# Patient Record
Sex: MALE | Race: WHITE | NOT HISPANIC OR LATINO | Employment: FULL TIME | URBAN - NONMETROPOLITAN AREA
[De-identification: names, ages, dates, MRNs, and addresses within clinical notes are randomized per-mention and may not be internally consistent; named-entity substitution may affect disease eponyms.]

---

## 2017-07-27 LAB
BASOPHILS ABSOLUTE: 0 K/UL (ref 0–0.2)
BASOPHILS RELATIVE PERCENT: 0.2 % (ref 0–1)
CREAT SERPL-MCNC: 0.7 MG/DL (ref 0.5–1.2)
EOSINOPHILS ABSOLUTE: 0.3 K/UL (ref 0–0.6)
EOSINOPHILS RELATIVE PERCENT: 2.7 % (ref 0–5)
GFR NON-AFRICAN AMERICAN: >60
HCT VFR BLD CALC: 28.4 % (ref 42–52)
HEMOGLOBIN: 9.1 G/DL (ref 14–18)
LYMPHOCYTES ABSOLUTE: 1.6 K/UL (ref 1.1–4.5)
LYMPHOCYTES RELATIVE PERCENT: 16.8 % (ref 20–40)
MCH RBC QN AUTO: 26.1 PG (ref 27–31)
MCHC RBC AUTO-ENTMCNC: 32 G/DL (ref 33–37)
MCV RBC AUTO: 81.6 FL (ref 80–94)
MONOCYTES ABSOLUTE: 0.8 K/UL (ref 0–0.9)
MONOCYTES RELATIVE PERCENT: 8.3 % (ref 0–10)
NEUTROPHILS ABSOLUTE: 6.8 K/UL (ref 1.5–7.5)
NEUTROPHILS RELATIVE PERCENT: 71.2 % (ref 50–65)
PDW BLD-RTO: 19.1 % (ref 11.5–14.5)
PLATELET # BLD: 438 K/UL (ref 130–400)
PMV BLD AUTO: 10.5 FL (ref 9.4–12.4)
RBC # BLD: 3.48 M/UL (ref 4.7–6.1)
WBC # BLD: 9.5 K/UL (ref 4.8–10.8)

## 2017-10-24 ENCOUNTER — OFFICE VISIT (OUTPATIENT)
Dept: GASTROENTEROLOGY | Facility: CLINIC | Age: 47
End: 2017-10-24

## 2017-10-24 VITALS
BODY MASS INDEX: 28.49 KG/M2 | OXYGEN SATURATION: 99 % | SYSTOLIC BLOOD PRESSURE: 124 MMHG | DIASTOLIC BLOOD PRESSURE: 90 MMHG | HEART RATE: 107 BPM | WEIGHT: 199 LBS | TEMPERATURE: 96.1 F | HEIGHT: 70 IN

## 2017-10-24 DIAGNOSIS — R13.14 PHARYNGOESOPHAGEAL DYSPHAGIA: ICD-10-CM

## 2017-10-24 DIAGNOSIS — K22.70 BARRETT'S ESOPHAGUS WITHOUT DYSPLASIA: ICD-10-CM

## 2017-10-24 DIAGNOSIS — Z83.71 FAMILY HX COLONIC POLYPS: ICD-10-CM

## 2017-10-24 DIAGNOSIS — Z80.0 FAMILY HX OF COLON CANCER: ICD-10-CM

## 2017-10-24 DIAGNOSIS — K62.5 BRBPR (BRIGHT RED BLOOD PER RECTUM): Primary | ICD-10-CM

## 2017-10-24 PROBLEM — Z83.719 FAMILY HX COLONIC POLYPS: Status: ACTIVE | Noted: 2017-10-24

## 2017-10-24 PROCEDURE — 99204 OFFICE O/P NEW MOD 45 MIN: CPT | Performed by: NURSE PRACTITIONER

## 2017-10-24 RX ORDER — ACETAMINOPHEN 500 MG
1000 TABLET ORAL EVERY 8 HOURS PRN
COMMUNITY

## 2017-10-24 RX ORDER — DIAZEPAM 10 MG/1
10 TABLET ORAL 2 TIMES DAILY PRN
COMMUNITY

## 2017-10-24 RX ORDER — DEXMETHYLPHENIDATE HYDROCHLORIDE 10 MG/1
10 TABLET ORAL 2 TIMES DAILY
COMMUNITY

## 2017-10-24 RX ORDER — RIFAMPIN 150 MG/1
CAPSULE ORAL 2 TIMES DAILY
COMMUNITY

## 2017-10-24 RX ORDER — OMEPRAZOLE 20 MG/1
20 CAPSULE, DELAYED RELEASE ORAL DAILY
Qty: 30 CAPSULE | Refills: 11 | Status: ON HOLD | OUTPATIENT
Start: 2017-10-24 | End: 2018-02-12 | Stop reason: SDUPTHER

## 2017-10-24 RX ORDER — POLYETHYLENE GLYCOL 3350, SODIUM CHLORIDE, SODIUM BICARBONATE, POTASSIUM CHLORIDE 420; 11.2; 5.72; 1.48 G/4L; G/4L; G/4L; G/4L
4000 POWDER, FOR SOLUTION ORAL SEE ADMIN INSTRUCTIONS
Qty: 4000 ML | Refills: 0 | Status: ON HOLD | OUTPATIENT
Start: 2017-10-24 | End: 2017-11-09

## 2017-10-24 RX ORDER — TIZANIDINE 4 MG/1
TABLET ORAL
Refills: 0 | COMMUNITY
Start: 2017-07-26

## 2017-10-24 RX ORDER — SULFAMETHOXAZOLE AND TRIMETHOPRIM 400; 80 MG/1; MG/1
1 TABLET ORAL 2 TIMES DAILY
COMMUNITY

## 2017-10-24 RX ORDER — OMEPRAZOLE 20 MG/1
20 CAPSULE, DELAYED RELEASE ORAL DAILY
COMMUNITY
End: 2017-10-24 | Stop reason: SDUPTHER

## 2017-10-24 RX ORDER — PREGABALIN 150 MG/1
150 CAPSULE ORAL 2 TIMES DAILY
COMMUNITY

## 2017-10-24 NOTE — PROGRESS NOTES
"[facility] GASTROENTEROLOGY - OFFICE NOTE    10/24/2017    Jae Guevara   1970    Primary Physician: Elmer Montague MD    Chief Complaint   Patient presents with   • GI Problem     barretts   difficulty swallowing.   Brbpr.     HISTORY OF PRESENT ILLNESS    Jae Guevara is a 47 y.o. male presents with singh's. Diagnosed with Singh's esophagus more than 5 years ago.  Takes ppi daily with good control of reflux symptoms.  He denies difficulty swallowing.  Denies hematemesis.  Denies nausea vomiting.  Denies abdominal pain.  Last upper endoscopy was August 2014.      Dysphagia  since 12/2016. Noted with solids and pills. Points to nape neck where feels like food will stick. If \" lean neck to side \" it is easier to swallow. Had egd 8/2014 with esoph dil and helped.  Taking PPI daily.      Intermittent brb pr, x years. This is usually small amount on tissue.  No rectal pain.  Last colonscopy was 11/2012 noting non-engorged hemorrhoidal veins, recommend repeat colonoscopy at age 52.  There is family history of colon cancer involving a paternal grandfather.  He has 2 paternal uncles and 2 paternal aunts who had colon polyps.  Denies constipation or diarrhea.  No abdominal pain.  No unintentional weight loss.    Past Medical History:   Diagnosis Date   • Anxiety    • Singh's esophagus    • Dysthymia    • GERD (gastroesophageal reflux disease)    • Hiatal hernia    • IBS (irritable bowel syndrome)    • Nephrolithiasis    • Schatzki's ring        Past Surgical History:   Procedure Laterality Date   • BACK SURGERY      x 2   • CERVICAL FUSION     • ENDOSCOPY  08/20/2014   • ENDOSCOPY AND COLONOSCOPY  11/05/2012   • OTHER SURGICAL HISTORY      Lithotripsy x 2   • TONSILLECTOMY AND ADENOIDECTOMY         Outpatient Prescriptions Marked as Taking for the 10/24/17 encounter (Office Visit) with LAMINE Aguilar   Medication Sig Dispense Refill   • acetaminophen (TYLENOL) 500 MG tablet Take 1,000 mg by mouth " Every 8 (Eight) Hours As Needed for Mild Pain .     • dexmethylphenidate (FOCALIN) 10 MG tablet Take 10 mg by mouth 2 (Two) Times a Day.     • diazePAM (VALIUM) 10 MG tablet Take 10 mg by mouth 2 (Two) Times a Day As Needed for Anxiety.     • omeprazole (priLOSEC) 20 MG capsule Take 1 capsule by mouth Daily. 30 capsule 11   • pregabalin (LYRICA) 150 MG capsule Take 150 mg by mouth 2 (Two) Times a Day.     • rifAMPin (RIFADIN) 150 MG capsule Take  by mouth 2 (Two) Times a Day.     • sulfamethoxazole-trimethoprim (BACTRIM,SEPTRA) 400-80 MG tablet Take 1 tablet by mouth 2 (Two) Times a Day.     • tiZANidine (ZANAFLEX) 4 MG tablet 1 (ONE) TABLET BY MOUTH EVERY 8 HOURS AS NEEDED FOR MUSCLE SPASMS. HOLD FOR OVERSEDATION/CONFUSION  0   • [DISCONTINUED] omeprazole (priLOSEC) 20 MG capsule Take 20 mg by mouth Daily.         No Known Allergies    Social History     Social History   • Marital status:      Spouse name: N/A   • Number of children: N/A   • Years of education: N/A     Occupational History   • Not on file.     Social History Main Topics   • Smoking status: Former Smoker   • Smokeless tobacco: Never Used   • Alcohol use No   • Drug use: No   • Sexual activity: Not on file     Other Topics Concern   • Not on file     Social History Narrative       Family History   Problem Relation Age of Onset   • Colon polyps Paternal Uncle      in his 50's   • Colon cancer Paternal Grandfather      in his 70's.    • Colon polyps Paternal Aunt      in her 50's    • Colon polyps Paternal Uncle      in his 50's.    • Colon polyps Paternal Aunt      in her 50's        Review of Systems   Constitutional: Negative for appetite change, chills, fatigue, fever and unexpected weight change.   HENT: Positive for trouble swallowing. Negative for sore throat.    Respiratory: Negative for cough, chest tightness, shortness of breath and wheezing.    Cardiovascular: Negative for chest pain and palpitations.   Gastrointestinal: Positive for  "anal bleeding and blood in stool. Negative for abdominal distention, abdominal pain, constipation, diarrhea, nausea, rectal pain and vomiting.        As mentioned in hpi   Genitourinary: Negative for difficulty urinating and hematuria.   Musculoskeletal: Negative for arthralgias and back pain.   Skin: Negative for color change and rash.   Neurological: Negative for dizziness, syncope, light-headedness and headaches.   Psychiatric/Behavioral: Negative for confusion. The patient is not nervous/anxious.        Vitals:    10/24/17 0827   BP: 124/90   BP Location: Left arm   Patient Position: Sitting   Cuff Size: Adult   Pulse: 107   Temp: 96.1 °F (35.6 °C)   SpO2: 99%   Weight: 199 lb (90.3 kg)   Height: 70\" (177.8 cm)      Body mass index is 28.55 kg/(m^2).    Physical Exam   Constitutional: He appears well-developed and well-nourished. No distress.   HENT:   Head: Normocephalic and atraumatic.   Eyes: EOM are normal. No scleral icterus.   Neck: Neck supple. No JVD present.   Cardiovascular: Normal rate, regular rhythm and normal heart sounds.    Pulmonary/Chest: Effort normal and breath sounds normal. No stridor.   Abdominal: Soft. Bowel sounds are normal. He exhibits no distension and no mass. There is no tenderness. There is no rebound and no guarding.   Musculoskeletal: He exhibits no edema.   Neurological: He is alert.   Skin: Skin is warm and dry. No rash noted.   Psychiatric: He has a normal mood and affect. His behavior is normal.   Vitals reviewed.              ASSESSMENT AND PLAN    Jae was seen today for gi problem.    Diagnoses and all orders for this visit:    BRBPR (bright red blood per rectum)  Comments:  Chronic  Orders:  -     Case Request; Standing  -     Case Request    Family hx colonic polyps  -     Case Request; Standing  -     Case Request    Family hx of colon cancer    Pharyngoesophageal dysphagia  -     Case Request; Standing  -     Case Request    Cabrera's esophagus without dysplasia  -     " Case Request; Standing  -     Case Request    Other orders  -     Implement Anesthesia Orders Day of Procedure; Standing  -     Obtain Informed Consent; Standing  -     polyethylene glycol-electrolytes (NULYTELY WITH FLAVOR PACKS) 420 g solution; Take 4,000 mL by mouth See Admin Instructions.  -     omeprazole (priLOSEC) 20 MG capsule; Take 1 capsule by mouth Daily.    In regards to bright red blood per rectum with family history of colon cancer, we will proceed with colonoscopy.  In regards to dysphagia as well as history of Cabrera's we will plan for upper endoscopy as well.  Recommend continue PPI on a daily basis.  ER if worsening symptoms of rectal bleeding.  Office visit 1 year.    ESOPHAGOGASTRODUODENOSCOPY WITH ANESTHESIA (N/A), COLONOSCOPY WITH ANESTHESIA (N/A)   All risks, benefits, alternatives, and indications of colonoscopy procedure have been discussed with the patient. Risks to include perforation of the colon requiring possible surgery or colostomy, risk of bleeding from biopsies or removal of colon tissue, possibility of missing a colon polyp or cancer, or adverse drug reaction.  Benefits to include the diagnosis and management of disease of the colon and rectum. Alternatives to include barium enema, radiographic evaluation, lab testing or no intervention. Pt verbalizes understanding and agrees.     Risk, benefits, and alternatives of endoscopy were explained in full.  They understand that there is a risk of bleeding, perforation, and infection.  The risk of perforation goes up with esophageal dilation.  Other options to evaluate UGI complaints could involve barium swallow or UGI series, but these would be diagnostic tests only.  Patient was given time to ask questions.  I answered them to their satisfaction and they are agreeable to proceeding    Body mass index is 28.55 kg/(m^2).           LAMINE Stewart    EMR Dragon/transcription disclaimer:  Much of this encounter note is electronic  transcription/translation of spoken language to printed text.  The electronic translation of spoken language may be erroneous, or at times, nonsensical words or phrases may be inadvertently transcribed.  Although I have reviewed the note for such errors, some may still exist.

## 2017-11-09 ENCOUNTER — PREP FOR SURGERY (OUTPATIENT)
Dept: OTHER | Facility: HOSPITAL | Age: 47
End: 2017-11-09

## 2017-11-09 ENCOUNTER — TELEPHONE (OUTPATIENT)
Dept: GASTROENTEROLOGY | Facility: CLINIC | Age: 47
End: 2017-11-09

## 2017-11-09 ENCOUNTER — ANESTHESIA EVENT (OUTPATIENT)
Dept: GASTROENTEROLOGY | Facility: HOSPITAL | Age: 47
End: 2017-11-09

## 2017-11-09 ENCOUNTER — ANESTHESIA (OUTPATIENT)
Dept: GASTROENTEROLOGY | Facility: HOSPITAL | Age: 47
End: 2017-11-09

## 2017-11-09 ENCOUNTER — HOSPITAL ENCOUNTER (OUTPATIENT)
Facility: HOSPITAL | Age: 47
Setting detail: HOSPITAL OUTPATIENT SURGERY
Discharge: HOME | End: 2017-11-09
Attending: INTERNAL MEDICINE | Admitting: INTERNAL MEDICINE

## 2017-11-09 VITALS
SYSTOLIC BLOOD PRESSURE: 146 MMHG | HEIGHT: 70 IN | DIASTOLIC BLOOD PRESSURE: 91 MMHG | OXYGEN SATURATION: 99 % | HEART RATE: 48 BPM | RESPIRATION RATE: 19 BRPM | TEMPERATURE: 97.2 F | WEIGHT: 199 LBS | BODY MASS INDEX: 28.49 KG/M2

## 2017-11-09 DIAGNOSIS — K62.5 BRBPR (BRIGHT RED BLOOD PER RECTUM): ICD-10-CM

## 2017-11-09 DIAGNOSIS — K22.70 BARRETT'S ESOPHAGUS WITHOUT DYSPLASIA: ICD-10-CM

## 2017-11-09 DIAGNOSIS — R13.14 PHARYNGOESOPHAGEAL DYSPHAGIA: ICD-10-CM

## 2017-11-09 DIAGNOSIS — Z80.0 FAMILY HX OF COLON CANCER: ICD-10-CM

## 2017-11-09 DIAGNOSIS — Z83.71 FAMILY HX COLONIC POLYPS: ICD-10-CM

## 2017-11-09 PROCEDURE — 43239 EGD BIOPSY SINGLE/MULTIPLE: CPT | Performed by: INTERNAL MEDICINE

## 2017-11-09 PROCEDURE — 88305 TISSUE EXAM BY PATHOLOGIST: CPT | Performed by: INTERNAL MEDICINE

## 2017-11-09 PROCEDURE — 43248 EGD GUIDE WIRE INSERTION: CPT | Performed by: INTERNAL MEDICINE

## 2017-11-09 PROCEDURE — 45378 DIAGNOSTIC COLONOSCOPY: CPT | Performed by: INTERNAL MEDICINE

## 2017-11-09 PROCEDURE — 25010000002 PROPOFOL 10 MG/ML EMULSION: Performed by: NURSE ANESTHETIST, CERTIFIED REGISTERED

## 2017-11-09 RX ORDER — ONDANSETRON 2 MG/ML
4 INJECTION INTRAMUSCULAR; INTRAVENOUS ONCE AS NEEDED
Status: DISCONTINUED | OUTPATIENT
Start: 2017-11-09 | End: 2017-11-09 | Stop reason: HOSPADM

## 2017-11-09 RX ORDER — OMEPRAZOLE 20 MG/1
20 CAPSULE, DELAYED RELEASE ORAL
Qty: 60 CAPSULE | Refills: 11 | Status: SHIPPED | OUTPATIENT
Start: 2017-11-09 | End: 2018-11-16 | Stop reason: SDUPTHER

## 2017-11-09 RX ORDER — SODIUM CHLORIDE 0.9 % (FLUSH) 0.9 %
3 SYRINGE (ML) INJECTION AS NEEDED
Status: DISCONTINUED | OUTPATIENT
Start: 2017-11-09 | End: 2017-11-09 | Stop reason: HOSPADM

## 2017-11-09 RX ORDER — LIDOCAINE HYDROCHLORIDE 20 MG/ML
INJECTION, SOLUTION INFILTRATION; PERINEURAL AS NEEDED
Status: DISCONTINUED | OUTPATIENT
Start: 2017-11-09 | End: 2017-11-09 | Stop reason: SURG

## 2017-11-09 RX ORDER — SODIUM CHLORIDE 9 MG/ML
500 INJECTION, SOLUTION INTRAVENOUS CONTINUOUS PRN
Status: DISCONTINUED | OUTPATIENT
Start: 2017-11-09 | End: 2017-11-09 | Stop reason: HOSPADM

## 2017-11-09 RX ORDER — MELATONIN
1000 DAILY
COMMUNITY

## 2017-11-09 RX ORDER — PROPOFOL 10 MG/ML
VIAL (ML) INTRAVENOUS AS NEEDED
Status: DISCONTINUED | OUTPATIENT
Start: 2017-11-09 | End: 2017-11-09 | Stop reason: SURG

## 2017-11-09 RX ADMIN — PROPOFOL 410 MG: 10 INJECTION, EMULSION INTRAVENOUS at 10:48

## 2017-11-09 RX ADMIN — SODIUM CHLORIDE 500 ML: 0.9 INJECTION, SOLUTION INTRAVENOUS at 10:43

## 2017-11-09 RX ADMIN — LIDOCAINE HYDROCHLORIDE 0.5 ML: 10 INJECTION, SOLUTION EPIDURAL; INFILTRATION; INTRACAUDAL; PERINEURAL at 10:42

## 2017-11-09 RX ADMIN — LIDOCAINE HYDROCHLORIDE 100 MG: 20 INJECTION, SOLUTION INFILTRATION; PERINEURAL at 10:48

## 2017-11-09 NOTE — PLAN OF CARE
Problem: Patient Care Overview (Adult)  Goal: Adult Individualization and Mutuality  Outcome: Ongoing (interventions implemented as appropriate)    11/09/17 1016   Individualization   Patient Specific Preferences none

## 2017-11-09 NOTE — H&P (VIEW-ONLY)
"[facility] GASTROENTEROLOGY - OFFICE NOTE    10/24/2017    Jae Guevara   1970    Primary Physician: Elmer Montague MD    Chief Complaint   Patient presents with   • GI Problem     barretts   difficulty swallowing.   Brbpr.     HISTORY OF PRESENT ILLNESS    Jae Guevara is a 47 y.o. male presents with singh's. Diagnosed with Singh's esophagus more than 5 years ago.  Takes ppi daily with good control of reflux symptoms.  He denies difficulty swallowing.  Denies hematemesis.  Denies nausea vomiting.  Denies abdominal pain.  Last upper endoscopy was August 2014.      Dysphagia  since 12/2016. Noted with solids and pills. Points to nape neck where feels like food will stick. If \" lean neck to side \" it is easier to swallow. Had egd 8/2014 with esoph dil and helped.  Taking PPI daily.      Intermittent brb pr, x years. This is usually small amount on tissue.  No rectal pain.  Last colonscopy was 11/2012 noting non-engorged hemorrhoidal veins, recommend repeat colonoscopy at age 52.  There is family history of colon cancer involving a paternal grandfather.  He has 2 paternal uncles and 2 paternal aunts who had colon polyps.  Denies constipation or diarrhea.  No abdominal pain.  No unintentional weight loss.    Past Medical History:   Diagnosis Date   • Anxiety    • Singh's esophagus    • Dysthymia    • GERD (gastroesophageal reflux disease)    • Hiatal hernia    • IBS (irritable bowel syndrome)    • Nephrolithiasis    • Schatzki's ring        Past Surgical History:   Procedure Laterality Date   • BACK SURGERY      x 2   • CERVICAL FUSION     • ENDOSCOPY  08/20/2014   • ENDOSCOPY AND COLONOSCOPY  11/05/2012   • OTHER SURGICAL HISTORY      Lithotripsy x 2   • TONSILLECTOMY AND ADENOIDECTOMY         Outpatient Prescriptions Marked as Taking for the 10/24/17 encounter (Office Visit) with LAMINE Aguilar   Medication Sig Dispense Refill   • acetaminophen (TYLENOL) 500 MG tablet Take 1,000 mg by mouth " Every 8 (Eight) Hours As Needed for Mild Pain .     • dexmethylphenidate (FOCALIN) 10 MG tablet Take 10 mg by mouth 2 (Two) Times a Day.     • diazePAM (VALIUM) 10 MG tablet Take 10 mg by mouth 2 (Two) Times a Day As Needed for Anxiety.     • omeprazole (priLOSEC) 20 MG capsule Take 1 capsule by mouth Daily. 30 capsule 11   • pregabalin (LYRICA) 150 MG capsule Take 150 mg by mouth 2 (Two) Times a Day.     • rifAMPin (RIFADIN) 150 MG capsule Take  by mouth 2 (Two) Times a Day.     • sulfamethoxazole-trimethoprim (BACTRIM,SEPTRA) 400-80 MG tablet Take 1 tablet by mouth 2 (Two) Times a Day.     • tiZANidine (ZANAFLEX) 4 MG tablet 1 (ONE) TABLET BY MOUTH EVERY 8 HOURS AS NEEDED FOR MUSCLE SPASMS. HOLD FOR OVERSEDATION/CONFUSION  0   • [DISCONTINUED] omeprazole (priLOSEC) 20 MG capsule Take 20 mg by mouth Daily.         No Known Allergies    Social History     Social History   • Marital status:      Spouse name: N/A   • Number of children: N/A   • Years of education: N/A     Occupational History   • Not on file.     Social History Main Topics   • Smoking status: Former Smoker   • Smokeless tobacco: Never Used   • Alcohol use No   • Drug use: No   • Sexual activity: Not on file     Other Topics Concern   • Not on file     Social History Narrative       Family History   Problem Relation Age of Onset   • Colon polyps Paternal Uncle      in his 50's   • Colon cancer Paternal Grandfather      in his 70's.    • Colon polyps Paternal Aunt      in her 50's    • Colon polyps Paternal Uncle      in his 50's.    • Colon polyps Paternal Aunt      in her 50's        Review of Systems   Constitutional: Negative for appetite change, chills, fatigue, fever and unexpected weight change.   HENT: Positive for trouble swallowing. Negative for sore throat.    Respiratory: Negative for cough, chest tightness, shortness of breath and wheezing.    Cardiovascular: Negative for chest pain and palpitations.   Gastrointestinal: Positive for  "anal bleeding and blood in stool. Negative for abdominal distention, abdominal pain, constipation, diarrhea, nausea, rectal pain and vomiting.        As mentioned in hpi   Genitourinary: Negative for difficulty urinating and hematuria.   Musculoskeletal: Negative for arthralgias and back pain.   Skin: Negative for color change and rash.   Neurological: Negative for dizziness, syncope, light-headedness and headaches.   Psychiatric/Behavioral: Negative for confusion. The patient is not nervous/anxious.        Vitals:    10/24/17 0827   BP: 124/90   BP Location: Left arm   Patient Position: Sitting   Cuff Size: Adult   Pulse: 107   Temp: 96.1 °F (35.6 °C)   SpO2: 99%   Weight: 199 lb (90.3 kg)   Height: 70\" (177.8 cm)      Body mass index is 28.55 kg/(m^2).    Physical Exam   Constitutional: He appears well-developed and well-nourished. No distress.   HENT:   Head: Normocephalic and atraumatic.   Eyes: EOM are normal. No scleral icterus.   Neck: Neck supple. No JVD present.   Cardiovascular: Normal rate, regular rhythm and normal heart sounds.    Pulmonary/Chest: Effort normal and breath sounds normal. No stridor.   Abdominal: Soft. Bowel sounds are normal. He exhibits no distension and no mass. There is no tenderness. There is no rebound and no guarding.   Musculoskeletal: He exhibits no edema.   Neurological: He is alert.   Skin: Skin is warm and dry. No rash noted.   Psychiatric: He has a normal mood and affect. His behavior is normal.   Vitals reviewed.              ASSESSMENT AND PLAN    Jae was seen today for gi problem.    Diagnoses and all orders for this visit:    BRBPR (bright red blood per rectum)  Comments:  Chronic  Orders:  -     Case Request; Standing  -     Case Request    Family hx colonic polyps  -     Case Request; Standing  -     Case Request    Family hx of colon cancer    Pharyngoesophageal dysphagia  -     Case Request; Standing  -     Case Request    Cabrera's esophagus without dysplasia  -     " Case Request; Standing  -     Case Request    Other orders  -     Implement Anesthesia Orders Day of Procedure; Standing  -     Obtain Informed Consent; Standing  -     polyethylene glycol-electrolytes (NULYTELY WITH FLAVOR PACKS) 420 g solution; Take 4,000 mL by mouth See Admin Instructions.  -     omeprazole (priLOSEC) 20 MG capsule; Take 1 capsule by mouth Daily.    In regards to bright red blood per rectum with family history of colon cancer, we will proceed with colonoscopy.  In regards to dysphagia as well as history of Cabrera's we will plan for upper endoscopy as well.  Recommend continue PPI on a daily basis.  ER if worsening symptoms of rectal bleeding.  Office visit 1 year.    ESOPHAGOGASTRODUODENOSCOPY WITH ANESTHESIA (N/A), COLONOSCOPY WITH ANESTHESIA (N/A)   All risks, benefits, alternatives, and indications of colonoscopy procedure have been discussed with the patient. Risks to include perforation of the colon requiring possible surgery or colostomy, risk of bleeding from biopsies or removal of colon tissue, possibility of missing a colon polyp or cancer, or adverse drug reaction.  Benefits to include the diagnosis and management of disease of the colon and rectum. Alternatives to include barium enema, radiographic evaluation, lab testing or no intervention. Pt verbalizes understanding and agrees.     Risk, benefits, and alternatives of endoscopy were explained in full.  They understand that there is a risk of bleeding, perforation, and infection.  The risk of perforation goes up with esophageal dilation.  Other options to evaluate UGI complaints could involve barium swallow or UGI series, but these would be diagnostic tests only.  Patient was given time to ask questions.  I answered them to their satisfaction and they are agreeable to proceeding    Body mass index is 28.55 kg/(m^2).           LAMINE Stewart    EMR Dragon/transcription disclaimer:  Much of this encounter note is electronic  transcription/translation of spoken language to printed text.  The electronic translation of spoken language may be erroneous, or at times, nonsensical words or phrases may be inadvertently transcribed.  Although I have reviewed the note for such errors, some may still exist.

## 2017-11-09 NOTE — ANESTHESIA PREPROCEDURE EVALUATION
Anesthesia Evaluation     Patient summary reviewed and Nursing notes reviewed   no history of anesthetic complications:  NPO Solid Status: > 8 hours  NPO Liquid Status: > 8 hours     Airway   Mallampati: I  TM distance: >3 FB  Neck ROM: limited  possible difficult intubation  Dental      Pulmonary     breath sounds clear to auscultation  (-) asthma, sleep apnea, not a smoker  Cardiovascular   Exercise tolerance: good (4-7 METS)    Rhythm: regular  Rate: normal    (-) hypertension, past MI, CAD      Neuro/Psych  (+) psychiatric history Anxiety,    (-) seizures, TIA, CVA  GI/Hepatic/Renal/Endo    (+)  hiatal hernia, GERD, renal disease stones,   (-) liver disease, diabetes    Musculoskeletal         ROS comment:   Back surgery 12/2016, multiple complications with infection, now on rifampin. Now has fusion c7-L1  Abdominal    Substance History      OB/GYN          Other            Phys Exam Other: Very limited neck extension since c7 fusion                                Anesthesia Plan    ASA 2     general     intravenous induction   Anesthetic plan and risks discussed with patient.

## 2017-11-09 NOTE — ANESTHESIA POSTPROCEDURE EVALUATION
"Patient: Jae Guevara    Procedure Summary     Date Anesthesia Start Anesthesia Stop Room / Location    11/09/17 1046 1120  [facility]       Procedure Diagnosis Surgeon Provider    ESOPHAGOGASTRODUODENOSCOPY WITH ANESTHESIA (N/A Esophagus); COLONOSCOPY WITH ANESTHESIA (N/A ) Family hx colonic polyps; BRBPR (bright red blood per rectum); Pharyngoesophageal dysphagia; Family hx of colon cancer; Cabrera's esophagus without dysplasia  (Family hx colonic polyps [Z83.71]; BRBPR (bright red blood per rectum) [K62.5]; Pharyngoesophageal dysphagia [R13.14]; Family hx of colon cancer [Z80.0]; Cabrera's esophagus without dysplasia [K22.70]) MD Rubina [facility] CRNA          Anesthesia Type: general  Last vitals  BP   114/79 (11/09/17 1017)   Temp   97.2 °F (36.2 °C) (11/09/17 1017)   Pulse   59 (11/09/17 1017)   Resp   18 (11/09/17 1017)     SpO2   96 % (11/09/17 1017)     Post Anesthesia Care and Evaluation    Patient location during evaluation: PACU  Patient participation: complete - patient participated  Level of consciousness: awake and alert  Pain score: 0  Pain management: adequate  Airway patency: patent  Anesthetic complications: No anesthetic complications    Cardiovascular status: acceptable  Respiratory status: acceptable  Hydration status: acceptable    Comments: Blood pressure 114/79, pulse 59, temperature 97.2 °F (36.2 °C), temperature source Temporal Artery , resp. rate 18, height 69.5\" (176.5 cm), weight 199 lb (90.3 kg), SpO2 96 %.        "

## 2017-11-09 NOTE — PLAN OF CARE
Problem: Patient Care Overview (Adult)  Goal: Plan of Care Review  Outcome: Outcome(s) achieved Date Met:  11/09/17 11/09/17 1142   Coping/Psychosocial Response Interventions   Plan Of Care Reviewed With patient;spouse   Patient Care Overview   Progress no change   Outcome Evaluation   Outcome Summary/Follow up Plan meets discharge criteria         Problem: GI Endoscopy (Adult)  Goal: Signs and Symptoms of Listed Potential Problems Will be Absent or Manageable (GI Endoscopy)  Outcome: Outcome(s) achieved Date Met:  11/09/17 11/09/17 1142   GI Endoscopy   Problems Assessed (GI Endoscopy) all   Problems Present (GI Endoscopy) none

## 2017-11-09 NOTE — PLAN OF CARE
Problem: Patient Care Overview (Adult)  Goal: Plan of Care Review  Outcome: Ongoing (interventions implemented as appropriate)    11/09/17 1051   Coping/Psychosocial Response Interventions   Plan Of Care Reviewed With patient   Patient Care Overview   Progress no change   Outcome Evaluation   Outcome Summary/Follow up Plan pt tolerated procedure well.

## 2017-11-10 LAB
CYTO UR: NORMAL
LAB AP CASE REPORT: NORMAL
LAB AP CLINICAL INFORMATION: NORMAL
Lab: NORMAL
PATH REPORT.FINAL DX SPEC: NORMAL
PATH REPORT.GROSS SPEC: NORMAL

## 2017-11-15 ENCOUNTER — PREP FOR SURGERY (OUTPATIENT)
Dept: OTHER | Facility: HOSPITAL | Age: 47
End: 2017-11-15

## 2017-11-15 DIAGNOSIS — K22.70 BARRETT'S ESOPHAGUS WITHOUT DYSPLASIA: Primary | ICD-10-CM

## 2018-02-12 ENCOUNTER — ANESTHESIA (OUTPATIENT)
Dept: GASTROENTEROLOGY | Facility: HOSPITAL | Age: 48
End: 2018-02-12

## 2018-02-12 ENCOUNTER — HOSPITAL ENCOUNTER (OUTPATIENT)
Facility: HOSPITAL | Age: 48
Setting detail: HOSPITAL OUTPATIENT SURGERY
Discharge: HOME | End: 2018-02-12
Attending: INTERNAL MEDICINE | Admitting: INTERNAL MEDICINE

## 2018-02-12 ENCOUNTER — ANESTHESIA EVENT (OUTPATIENT)
Dept: GASTROENTEROLOGY | Facility: HOSPITAL | Age: 48
End: 2018-02-12

## 2018-02-12 VITALS
OXYGEN SATURATION: 94 % | SYSTOLIC BLOOD PRESSURE: 105 MMHG | TEMPERATURE: 97.5 F | WEIGHT: 201 LBS | RESPIRATION RATE: 17 BRPM | HEIGHT: 70 IN | BODY MASS INDEX: 28.77 KG/M2 | DIASTOLIC BLOOD PRESSURE: 72 MMHG | HEART RATE: 58 BPM

## 2018-02-12 DIAGNOSIS — K22.70 BARRETT'S ESOPHAGUS WITHOUT DYSPLASIA: ICD-10-CM

## 2018-02-12 PROCEDURE — 43248 EGD GUIDE WIRE INSERTION: CPT | Performed by: INTERNAL MEDICINE

## 2018-02-12 PROCEDURE — 88305 TISSUE EXAM BY PATHOLOGIST: CPT | Performed by: INTERNAL MEDICINE

## 2018-02-12 PROCEDURE — 43239 EGD BIOPSY SINGLE/MULTIPLE: CPT | Performed by: INTERNAL MEDICINE

## 2018-02-12 PROCEDURE — 25010000002 PROPOFOL 10 MG/ML EMULSION: Performed by: NURSE ANESTHETIST, CERTIFIED REGISTERED

## 2018-02-12 RX ORDER — SODIUM CHLORIDE 9 MG/ML
500 INJECTION, SOLUTION INTRAVENOUS CONTINUOUS PRN
Status: DISCONTINUED | OUTPATIENT
Start: 2018-02-12 | End: 2018-02-12 | Stop reason: HOSPADM

## 2018-02-12 RX ORDER — SODIUM CHLORIDE 0.9 % (FLUSH) 0.9 %
3 SYRINGE (ML) INJECTION AS NEEDED
Status: DISCONTINUED | OUTPATIENT
Start: 2018-02-12 | End: 2018-02-12 | Stop reason: HOSPADM

## 2018-02-12 RX ORDER — ONDANSETRON 2 MG/ML
4 INJECTION INTRAMUSCULAR; INTRAVENOUS ONCE AS NEEDED
Status: DISCONTINUED | OUTPATIENT
Start: 2018-02-12 | End: 2018-02-12 | Stop reason: HOSPADM

## 2018-02-12 RX ORDER — PROPOFOL 10 MG/ML
VIAL (ML) INTRAVENOUS AS NEEDED
Status: DISCONTINUED | OUTPATIENT
Start: 2018-02-12 | End: 2018-02-12 | Stop reason: SURG

## 2018-02-12 RX ORDER — LIDOCAINE HYDROCHLORIDE 20 MG/ML
INJECTION, SOLUTION INFILTRATION; PERINEURAL AS NEEDED
Status: DISCONTINUED | OUTPATIENT
Start: 2018-02-12 | End: 2018-02-12 | Stop reason: SURG

## 2018-02-12 RX ADMIN — LIDOCAINE HYDROCHLORIDE 0.5 ML: 10 INJECTION, SOLUTION EPIDURAL; INFILTRATION; INTRACAUDAL; PERINEURAL at 09:36

## 2018-02-12 RX ADMIN — PROPOFOL 230 MG: 10 INJECTION, EMULSION INTRAVENOUS at 11:26

## 2018-02-12 RX ADMIN — LIDOCAINE HYDROCHLORIDE 100 MG: 20 INJECTION, SOLUTION INFILTRATION; PERINEURAL at 11:26

## 2018-02-12 RX ADMIN — SODIUM CHLORIDE 500 ML: 9 INJECTION, SOLUTION INTRAVENOUS at 09:37

## 2018-02-12 NOTE — PLAN OF CARE
Problem: Patient Care Overview (Adult)  Goal: Adult Individualization and Mutuality  Outcome: Ongoing (interventions implemented as appropriate)   02/12/18 0965   Individualization   Patient Specific Preferences none

## 2018-02-12 NOTE — ANESTHESIA POSTPROCEDURE EVALUATION
"Patient: Jae Guevara    Procedure Summary     Date Anesthesia Start Anesthesia Stop Room / Location    02/12/18 1119 1135  [facility]       Procedure Diagnosis Surgeon Provider    ESOPHAGOGASTRODUODENOSCOPY WITH ANESTHESIA (N/A Esophagus) Cabrera's esophagus without dysplasia  (Cabrera's esophagus without dysplasia [K22.70]) MD Kristy Reynolds CRNA          Anesthesia Type: general  Last vitals  BP   106/69 (02/12/18 1150)   Temp   97.5 °F (36.4 °C) (02/12/18 0918)   Pulse   64 (02/12/18 1150)   Resp   16 (02/12/18 1150)     SpO2   94 % (02/12/18 1150)     Post Anesthesia Care and Evaluation    Patient location during evaluation: bedside  Patient participation: complete - patient participated  Level of consciousness: awake and awake and alert  Pain score: 0  Pain management: adequate  Airway patency: patent  Anesthetic complications: No anesthetic complications  PONV Status: none  Cardiovascular status: acceptable  Respiratory status: acceptable  Hydration status: acceptable    Comments: Patient discharged according to acceptable Zander score per RN assessment. See nursing records for further information.     Blood pressure 106/69, pulse 64, temperature 97.5 °F (36.4 °C), temperature source Temporal Artery , resp. rate 16, height 177.8 cm (70\"), weight 91.2 kg (201 lb), SpO2 94 %.        "

## 2018-02-12 NOTE — PLAN OF CARE
Problem: Patient Care Overview (Adult)  Goal: Plan of Care Review  Outcome: Outcome(s) achieved Date Met: 02/12/18 02/12/18 1136   Patient Care Overview   Progress no change   Coping/Psychosocial Response Interventions   Plan Of Care Reviewed With patient;family       Problem: GI Endoscopy (Adult)  Goal: Signs and Symptoms of Listed Potential Problems Will be Absent or Manageable (GI Endoscopy)  Outcome: Outcome(s) achieved Date Met: 02/12/18 02/12/18 1136   GI Endoscopy   Problems Assessed (GI Endoscopy) all   Problems Present (GI Endoscopy) none

## 2018-02-12 NOTE — ANESTHESIA PREPROCEDURE EVALUATION
Anesthesia Evaluation     Patient summary reviewed and Nursing notes reviewed   no history of anesthetic complications:  NPO Solid Status: > 8 hours  NPO Liquid Status: N/A           Airway   Mallampati: I  TM distance: >3 FB  Neck ROM: limited  possible difficult intubation  Dental      Pulmonary     breath sounds clear to auscultation  (+) a smoker Current Smoked day of surgery,   (-) asthma, sleep apnea  Cardiovascular   Exercise tolerance: good (4-7 METS)    Rhythm: regular  Rate: normal    (-) hypertension, past MI, CAD      Neuro/Psych  (+) psychiatric history Anxiety,     (-) seizures, TIA, CVA  GI/Hepatic/Renal/Endo    (+)  hiatal hernia, GERD, renal disease stones,   (-) liver disease, diabetes    Musculoskeletal         ROS comment: Back surgery 12/2016, multiple complications with infection. Remains on bactrim. Now has fusion c7-L1  Abdominal    Substance History      OB/GYN          Other            Phys Exam Other: Very limited neck extension since c7 fusion                Anesthesia Plan    ASA 2     general   total IV anesthesia  intravenous induction   Anesthetic plan and risks discussed with patient.

## 2018-02-12 NOTE — H&P
[facility] Gastroenterology  Pre Procedure History & Physical    Chief Complaint:   Dysphagia    Subjective     HPI:   The patient complained of dysphagia.  He had an endoscopy 3 months ago noting active esophagitis.  He was on omeprazole and there was increased to twice a day.  He underwent passive dilation with a 54 French.  Notes continued trouble with swallowing.  Denies any heartburn.    Past Medical History:   Past Medical History:   Diagnosis Date   • Anxiety    • Cabrera's esophagus    • Dysthymia    • GERD (gastroesophageal reflux disease)    • Hiatal hernia    • IBS (irritable bowel syndrome)    • Nephrolithiasis    • Schatzki's ring        Past Surgical History:  Past Surgical History:   Procedure Laterality Date   • BACK SURGERY      x 2   • CERVICAL FUSION     • COLONOSCOPY N/A 11/9/2017    Procedure: COLONOSCOPY WITH ANESTHESIA;  Surgeon: David Portillo MD;  Location: [facility] ENDOSCOPY;  Service:    • ENDOSCOPY  08/20/2014   • ENDOSCOPY N/A 11/9/2017    Procedure: ESOPHAGOGASTRODUODENOSCOPY WITH ANESTHESIA;  Surgeon: David Portillo MD;  Location: [facility] ENDOSCOPY;  Service:    • ENDOSCOPY AND COLONOSCOPY  11/05/2012   • OTHER SURGICAL HISTORY      Lithotripsy x 2   • TONSILLECTOMY AND ADENOIDECTOMY         Family History:  Family History   Problem Relation Age of Onset   • Colon polyps Paternal Uncle      in his 50's   • Colon cancer Paternal Grandfather      in his 70's.    • Colon polyps Paternal Aunt      in her 50's    • Colon polyps Paternal Uncle      in his 50's.    • Colon polyps Paternal Aunt      in her 50's        Social History:   reports that he has quit smoking. He has never used smokeless tobacco. He reports that he does not drink alcohol or use illicit drugs.    Medications:   Prior to Admission medications    Medication Sig Start Date End Date Taking? Authorizing Provider   acetaminophen (TYLENOL) 500 MG tablet Take 1,000 mg by mouth Every 8 (Eight) Hours As Needed for Mild  "Pain .   Yes Historical Provider, MD   cholecalciferol (VITAMIN D3) 1000 units tablet Take 1,000 Units by mouth Daily.   Yes Historical Provider, MD   dexmethylphenidate (FOCALIN) 10 MG tablet Take 10 mg by mouth 2 (Two) Times a Day.   Yes Historical Provider, MD   diazePAM (VALIUM) 10 MG tablet Take 10 mg by mouth 2 (Two) Times a Day As Needed for Anxiety.   Yes Historical Provider, MD   omeprazole (priLOSEC) 20 MG capsule Take 1 capsule by mouth 2 (Two) Times a Day Before Meals. 11/9/17  Yes David Portillo MD   sulfamethoxazole-trimethoprim (BACTRIM,SEPTRA) 400-80 MG tablet Take 1 tablet by mouth 2 (Two) Times a Day.   Yes Historical Provider, MD   tiZANidine (ZANAFLEX) 4 MG tablet 1 (ONE) TABLET BY MOUTH EVERY 8 HOURS AS NEEDED FOR MUSCLE SPASMS. HOLD FOR OVERSEDATION/CONFUSION 7/26/17  Yes Historical Provider, MD   pregabalin (LYRICA) 150 MG capsule Take 150 mg by mouth 2 (Two) Times a Day.    Historical Provider, MD   rifAMPin (RIFADIN) 150 MG capsule Take  by mouth 2 (Two) Times a Day.    Historical Provider, MD   omeprazole (priLOSEC) 20 MG capsule Take 1 capsule by mouth Daily. 10/24/17 2/12/18  Janette Meek, LAMINE       Allergies:  Review of patient's allergies indicates no known allergies.    ROS:    General: Weight stable  Resp: No SOA  Cardiovascular: No CP    Objective     Blood pressure 125/80, pulse 62, temperature 97.5 °F (36.4 °C), temperature source Temporal Artery , resp. rate 16, height 177.8 cm (70\"), weight 91.2 kg (201 lb), SpO2 96 %.    Physical Exam   Constitutional: Pt is oriented to person, place, and in no distress.   HENT: Mouth/Throat: Oropharynx is clear.   Cardiovascular: Normal rate, regular rhythm.    Pulmonary/Chest: Effort normal. No respiratory distress. No  wheezes.   Abdominal: Soft. Non-distended.  Skin: Skin is warm and dry.   Psychiatric: Mood, memory, affect and judgment appear normal.     Assessment/Plan     Diagnosis:  Dysphagia    Anticipated Surgical " Procedure:  Endoscopy    The risks, benefits, and alternatives of this procedure have been discussed with the patient or the responsible party- the patient understands and agrees to proceed.      EMR Dragon/transcription disclaimer:  Much of this encounter note is electronic transcription/translation of spoken language to printed text.  The electronic translation of spoken language may be erroneous, or at times, nonsensical words or phrases may be inadvertently transcribed.  Although I have reviewed the note for such errors, some may still exist.

## 2018-02-12 NOTE — PLAN OF CARE
Problem: Patient Care Overview (Adult)  Goal: Plan of Care Review  Outcome: Ongoing (interventions implemented as appropriate)   02/12/18 1126   Patient Care Overview   Progress improving   Outcome Evaluation   Outcome Summary/Follow up Plan pt tolerating procedure well        Problem: GI Endoscopy (Adult)  Goal: Signs and Symptoms of Listed Potential Problems Will be Absent or Manageable (GI Endoscopy)  Outcome: Ongoing (interventions implemented as appropriate)   02/12/18 1126   GI Endoscopy   Problems Assessed (GI Endoscopy) all   Problems Present (GI Endoscopy) none

## 2018-02-13 LAB
CYTO UR: NORMAL
LAB AP CASE REPORT: NORMAL
Lab: NORMAL
PATH REPORT.FINAL DX SPEC: NORMAL
PATH REPORT.GROSS SPEC: NORMAL

## 2018-11-19 RX ORDER — OMEPRAZOLE 20 MG/1
20 CAPSULE, DELAYED RELEASE ORAL
Qty: 60 CAPSULE | Refills: 11 | Status: SHIPPED | OUTPATIENT
Start: 2018-11-19 | End: 2019-12-30

## 2019-12-30 RX ORDER — OMEPRAZOLE 20 MG/1
20 CAPSULE, DELAYED RELEASE ORAL
Qty: 60 CAPSULE | Refills: 2 | Status: SHIPPED | OUTPATIENT
Start: 2019-12-30

## 2021-03-16 ENCOUNTER — OFFICE VISIT (OUTPATIENT)
Dept: GASTROENTEROLOGY | Facility: CLINIC | Age: 51
End: 2021-03-16

## 2021-03-16 VITALS
OXYGEN SATURATION: 99 % | SYSTOLIC BLOOD PRESSURE: 126 MMHG | BODY MASS INDEX: 28.63 KG/M2 | TEMPERATURE: 97.3 F | DIASTOLIC BLOOD PRESSURE: 86 MMHG | WEIGHT: 200 LBS | HEIGHT: 70 IN | HEART RATE: 90 BPM

## 2021-03-16 DIAGNOSIS — R13.14 PHARYNGOESOPHAGEAL DYSPHAGIA: ICD-10-CM

## 2021-03-16 DIAGNOSIS — R10.10 PAIN OF UPPER ABDOMEN: ICD-10-CM

## 2021-03-16 DIAGNOSIS — K22.70 BARRETT'S ESOPHAGUS WITHOUT DYSPLASIA: Primary | ICD-10-CM

## 2021-03-16 PROCEDURE — 99204 OFFICE O/P NEW MOD 45 MIN: CPT | Performed by: NURSE PRACTITIONER

## 2021-03-16 RX ORDER — IBUPROFEN 800 MG/1
800 TABLET ORAL 2 TIMES DAILY
COMMUNITY

## 2021-03-16 RX ORDER — DICYCLOMINE HYDROCHLORIDE 10 MG/1
10 CAPSULE ORAL EVERY 6 HOURS PRN
Qty: 30 CAPSULE | Refills: 1 | Status: SHIPPED | OUTPATIENT
Start: 2021-03-16 | End: 2021-06-01

## 2021-03-16 NOTE — PROGRESS NOTES
"[facility] GASTROENTEROLOGY - OFFICE NOTE    3/16/2021    Jae Guevara   1970    Primary Physician: Elmer Montague MD    Chief Complaint   Patient presents with   • Endoscopy   singh's esophagus   Dysphagia  Upper abdominal pain.    HISTORY OF PRESENT ILLNESS:     Jae Guevara is a 50 y.o. male presents with Singh's esophagus diagnosed more than 5 years ago. He takes omeprazole twice daily with good control of reflux symptoms.  He gets refills of omeprazole from his PCP now.  No weight loss or abdominal pain.      Dysphagia  This is with solid foods and pills. He has had esophageal dilation that has helped.  He points to the neck/upper chest area where this occurs.  He has had to regurgitate to get relief at times.         Upper abdominal pain  Intermittent since 12/2016. The pain is upper abdomen and described as a \"jarrett horse\". This is a sharp pain that can occur anytime or after eating. It can last few seconds to 10 minutes.  Messaging or applying pressure can help. No n/v. No fever. No weight loss. No flatus or belching.       EGD 2/2018   - Esophageal mucosal changes secondary to established short-segment Singh's disease. Biopsied.  - Normal stomach.  - Normal examined duodenum.  - Dilation performed in the entire esophagus.  - resolved esophagitis  Esophageal biopsy path negative for dysplasia.     =============================================================  Ov  10-24-17 HPI Jae Guevara is a 47 y.o. male presents with singh's. Diagnosed with Singh's esophagus more than 5 years ago.  Takes ppi daily with good control of reflux symptoms.  He denies difficulty swallowing.  Denies hematemesis.  Denies nausea vomiting.  Denies abdominal pain.  Last upper endoscopy was August 2014.        Dysphagia  since 12/2016. Noted with solids and pills. Points to nape neck where feels like food will stick. If \" lean neck to side \" it is easier to swallow. Had egd 8/2014 with tanvioph dil and " helped.  Taking PPI daily.        Intermittent brb pr, x years. This is usually small amount on tissue.  No rectal pain.  Last colonscopy was 11/2012 noting non-engorged hemorrhoidal veins, recommend repeat colonoscopy at age 52.  There is family history of colon cancer involving a paternal grandfather.  He has 2 paternal uncles and 2 paternal aunts who had colon polyps.  Denies constipation or diarrhea.  No abdominal pain.  No unintentional weight loss.    Past Medical History:   Diagnosis Date   • Anxiety    • Cabrera's esophagus    • Dysthymia    • GERD (gastroesophageal reflux disease)    • Hiatal hernia    • IBS (irritable bowel syndrome)    • Nephrolithiasis    • Schatzki's ring        Past Surgical History:   Procedure Laterality Date   • BACK SURGERY      x 2   • CERVICAL FUSION     • COLONOSCOPY N/A 11/9/2017    Procedure: COLONOSCOPY WITH ANESTHESIA;  Surgeon: David Portillo MD;  Location: [facility] ENDOSCOPY;  Service:    • ENDOSCOPY  08/20/2014   • ENDOSCOPY N/A 11/9/2017    Procedure: ESOPHAGOGASTRODUODENOSCOPY WITH ANESTHESIA;  Surgeon: David Portillo MD;  Location: [facility] ENDOSCOPY;  Service:    • ENDOSCOPY N/A 2/12/2018    Procedure: ESOPHAGOGASTRODUODENOSCOPY WITH ANESTHESIA;  Surgeon: David Portillo MD;  Location: [facility] ENDOSCOPY;  Service:    • ENDOSCOPY AND COLONOSCOPY  11/05/2012   • OTHER SURGICAL HISTORY      Lithotripsy x 2   • TONSILLECTOMY AND ADENOIDECTOMY         Outpatient Medications Marked as Taking for the 3/16/21 encounter (Office Visit) with Janette Meek APRN   Medication Sig Dispense Refill   • dexmethylphenidate (FOCALIN) 10 MG tablet Take 10 mg by mouth 2 (Two) Times a Day.     • diazePAM (VALIUM) 10 MG tablet Take 10 mg by mouth 2 (Two) Times a Day As Needed for Anxiety.     • ibuprofen (ADVIL,MOTRIN) 800 MG tablet Take 800 mg by mouth 2 (two) times a day.     • omeprazole (priLOSEC) 20 MG capsule Take 1 capsule by mouth 2 (Two) Times a Day Before Meals. 60 capsule 2  "  • pregabalin (LYRICA) 150 MG capsule Take 150 mg by mouth 2 (Two) Times a Day.     • sulfamethoxazole-trimethoprim (BACTRIM,SEPTRA) 400-80 MG tablet Take 1 tablet by mouth 2 (Two) Times a Day.     • tiZANidine (ZANAFLEX) 4 MG tablet 1 (ONE) TABLET BY MOUTH EVERY 8 HOURS AS NEEDED FOR MUSCLE SPASMS. HOLD FOR OVERSEDATION/CONFUSION  0       No Known Allergies    Social History     Socioeconomic History   • Marital status:      Spouse name: Not on file   • Number of children: Not on file   • Years of education: Not on file   • Highest education level: Not on file   Tobacco Use   • Smoking status: Current Every Day Smoker   • Smokeless tobacco: Never Used   Substance and Sexual Activity   • Alcohol use: No   • Drug use: No   • Sexual activity: Defer       Family History   Problem Relation Age of Onset   • Colon polyps Paternal Uncle         in his 50's   • Colon cancer Paternal Grandfather         in his 70's.    • Colon polyps Paternal Aunt         in her 50's    • Colon polyps Paternal Uncle         in his 50's.    • Colon polyps Paternal Aunt         in her 50's        Review of Systems   Constitutional: Negative for chills, fever and unexpected weight change.   HENT: Positive for trouble swallowing.    Respiratory: Negative for cough, shortness of breath and wheezing.    Cardiovascular: Negative for chest pain and palpitations.   Gastrointestinal: Positive for abdominal pain. Negative for abdominal distention, anal bleeding, blood in stool, constipation, diarrhea, nausea and vomiting.        Vitals:    03/16/21 1405   BP: 126/86   Pulse: 90   Temp: 97.3 °F (36.3 °C)   SpO2: 99%   Weight: 90.7 kg (200 lb)   Height: 177.8 cm (70\")      Body mass index is 28.7 kg/m².    Physical Exam  Vitals reviewed.   Constitutional:       General: He is not in acute distress.  Cardiovascular:      Rate and Rhythm: Normal rate and regular rhythm.      Heart sounds: Normal heart sounds.   Pulmonary:      Effort: Pulmonary " "effort is normal.      Breath sounds: Normal breath sounds.   Abdominal:      General: Bowel sounds are normal. There is no distension.      Palpations: Abdomen is soft.      Tenderness: There is no abdominal tenderness.   Skin:     General: Skin is warm and dry.   Neurological:      Mental Status: He is alert.         Results for orders placed or performed during the hospital encounter of 02/12/18   Tissue Pathology Exam - Tissue, Esophagus, Distal    Specimen: Esophagus, Distal; Tissue   Result Value Ref Range    Case Report       Surgical Pathology Report                         Case: [id number]                                  Authorizing Provider:  David Portillo MD       Collected:           02/12/2018 11:29 AM          Ordering Location:     [facility]     Received:            02/12/2018 02:18 PM                                 ENDOSCOPY                                                                    Pathologist:           Nerissa Agosto MD                                                          Specimen:    Esophagus, Distal, bx at distal esophagus                                                  Final Diagnosis       Esophagus, distal, biopsy:  Benign gastroesophageal junctional-type mucosa with a mild to moderate chronic esophagogastritis.  Negative for intestinal metaplasia.  Negative for dysplasia      Gross Description       Specimen #1 is received in a formalin filled container, labeled with the patient's name, date of birth, and \"biopsy at distal esophagus\".  The specimen consists of 4 tan-pink soft tissue fragments aggregating to 0.5 x 0.3 x 0.1 cm, totally submitted in block 1A.      Microscopic Description       Histologic sections demonstrate benign squamocolumnar junctional type mucosa.  A moderate mixed acute and chronic inflammatory infiltrate is present.  There is no evidence of intestinal metaplasia or dysplasia.      Embedded Images             ASSESSMENT AND " PLAN    Assessment/Plan     Diagnoses and all orders for this visit:    1. Cabrera's esophagus without dysplasia (Primary)  -     Case Request; Standing  -     Case Request    2. Pharyngoesophageal dysphagia  -     Case Request; Standing  -     Case Request    3. Pain of upper abdomen  -     Case Request; Standing  -     Case Request    Other orders  -     dicyclomine (Bentyl) 10 MG capsule; Take 1 capsule by mouth Every 6 (Six) Hours As Needed (abdominal pain).  Dispense: 30 capsule; Refill: 1  -     Follow Anesthesia Guidelines / Protocol; Future  -     Implement Anesthesia Orders Day of Procedure; Standing  -     Obtain Informed Consent; Standing  -     Obtain Informed Consent; Future      In regards to Cabrera's esophagus, he is due for upper endoscopy and is agreeable to scheduling.  We discussed Cabrera's esophagus and is can to develop cancer down the road and he verbalized understanding.  I would recommend that he continue taking a PPI on a daily basis.    In regards to dysphagia, differential diagnosis discussed.  Will await EGD results.  Until then recommend cut food in small pieces and chew well.    In regards to upper abdominal pain, unclear etiology.  It is a possibility that it could be intestinal spasms.  Await upper endoscopy results.  We did discuss trial of Bentyl and he is agreeable.  I discussed adverse effects of Bentyl.     ESOPHAGOGASTRODUODENOSCOPY WITH ANESTHESIA (N/A)   Risk, benefits, and alternatives of endoscopy were explained in full.  They understand that there is a risk of bleeding, perforation, and infection.  The risk of perforation goes up with esophageal dilation.  Other options to evaluate UGI complaints could involve barium swallow or UGI series, but these would be diagnostic tests only.  Patient was given time to ask questions.  I answered them to their satisfaction and they are agreeable to proceeding         Body mass index is 28.7 kg/m².    Patient's Body mass index is 28.7  kg/m². BMI is above normal parameters. Recommendations include: Recommend weight loss, no follow-up..             LAMINE Stewart    EMR Dragon/transcription disclaimer:  Much of this encounter note is electronic transcription/translation of spoken language to printed text.  The electronic translation of spoken language may be erroneous, or at times, nonsensical words or phrases may be inadvertently transcribed.  Although I have reviewed the note for such errors, some may still exist.

## 2021-03-22 ENCOUNTER — TRANSCRIBE ORDERS (OUTPATIENT)
Dept: LAB | Facility: HOSPITAL | Age: 51
End: 2021-03-22

## 2021-03-22 ENCOUNTER — LAB (OUTPATIENT)
Dept: LAB | Facility: HOSPITAL | Age: 51
End: 2021-03-22

## 2021-03-22 DIAGNOSIS — Z01.818 PREOPERATIVE TESTING: Primary | ICD-10-CM

## 2021-03-22 LAB — SARS-COV-2 ORF1AB RESP QL NAA+PROBE: NOT DETECTED

## 2021-03-22 PROCEDURE — U0004 COV-19 TEST NON-CDC HGH THRU: HCPCS | Performed by: INTERNAL MEDICINE

## 2021-03-22 PROCEDURE — C9803 HOPD COVID-19 SPEC COLLECT: HCPCS | Performed by: INTERNAL MEDICINE

## 2021-03-25 ENCOUNTER — ANESTHESIA (OUTPATIENT)
Dept: GASTROENTEROLOGY | Facility: HOSPITAL | Age: 51
End: 2021-03-25

## 2021-03-25 ENCOUNTER — ANESTHESIA EVENT (OUTPATIENT)
Dept: GASTROENTEROLOGY | Facility: HOSPITAL | Age: 51
End: 2021-03-25

## 2021-03-25 ENCOUNTER — HOSPITAL ENCOUNTER (OUTPATIENT)
Facility: HOSPITAL | Age: 51
Setting detail: HOSPITAL OUTPATIENT SURGERY
Discharge: HOME | End: 2021-03-25
Attending: INTERNAL MEDICINE | Admitting: INTERNAL MEDICINE

## 2021-03-25 VITALS
WEIGHT: 196 LBS | BODY MASS INDEX: 28.06 KG/M2 | HEART RATE: 64 BPM | OXYGEN SATURATION: 99 % | TEMPERATURE: 97.2 F | RESPIRATION RATE: 18 BRPM | SYSTOLIC BLOOD PRESSURE: 122 MMHG | DIASTOLIC BLOOD PRESSURE: 89 MMHG | HEIGHT: 70 IN

## 2021-03-25 DIAGNOSIS — K22.70 BARRETT'S ESOPHAGUS WITHOUT DYSPLASIA: ICD-10-CM

## 2021-03-25 DIAGNOSIS — R10.10 PAIN OF UPPER ABDOMEN: ICD-10-CM

## 2021-03-25 DIAGNOSIS — R13.14 PHARYNGOESOPHAGEAL DYSPHAGIA: ICD-10-CM

## 2021-03-25 PROCEDURE — 88305 TISSUE EXAM BY PATHOLOGIST: CPT | Performed by: INTERNAL MEDICINE

## 2021-03-25 PROCEDURE — 25010000002 PROPOFOL 10 MG/ML EMULSION: Performed by: NURSE ANESTHETIST, CERTIFIED REGISTERED

## 2021-03-25 PROCEDURE — 43248 EGD GUIDE WIRE INSERTION: CPT | Performed by: INTERNAL MEDICINE

## 2021-03-25 PROCEDURE — 43239 EGD BIOPSY SINGLE/MULTIPLE: CPT | Performed by: INTERNAL MEDICINE

## 2021-03-25 RX ORDER — LIDOCAINE HYDROCHLORIDE 10 MG/ML
0.5 INJECTION, SOLUTION EPIDURAL; INFILTRATION; INTRACAUDAL; PERINEURAL ONCE AS NEEDED
Status: DISCONTINUED | OUTPATIENT
Start: 2021-03-25 | End: 2021-03-25 | Stop reason: HOSPADM

## 2021-03-25 RX ORDER — ONDANSETRON 2 MG/ML
4 INJECTION INTRAMUSCULAR; INTRAVENOUS ONCE AS NEEDED
Status: DISCONTINUED | OUTPATIENT
Start: 2021-03-25 | End: 2021-03-25 | Stop reason: HOSPADM

## 2021-03-25 RX ORDER — PROPOFOL 10 MG/ML
VIAL (ML) INTRAVENOUS AS NEEDED
Status: DISCONTINUED | OUTPATIENT
Start: 2021-03-25 | End: 2021-03-25 | Stop reason: SURG

## 2021-03-25 RX ORDER — LIDOCAINE HYDROCHLORIDE 20 MG/ML
INJECTION, SOLUTION EPIDURAL; INFILTRATION; INTRACAUDAL; PERINEURAL AS NEEDED
Status: DISCONTINUED | OUTPATIENT
Start: 2021-03-25 | End: 2021-03-25 | Stop reason: SURG

## 2021-03-25 RX ORDER — SODIUM CHLORIDE 9 MG/ML
500 INJECTION, SOLUTION INTRAVENOUS CONTINUOUS PRN
Status: DISCONTINUED | OUTPATIENT
Start: 2021-03-25 | End: 2021-03-25 | Stop reason: HOSPADM

## 2021-03-25 RX ORDER — SODIUM CHLORIDE 0.9 % (FLUSH) 0.9 %
10 SYRINGE (ML) INJECTION AS NEEDED
Status: DISCONTINUED | OUTPATIENT
Start: 2021-03-25 | End: 2021-03-25 | Stop reason: HOSPADM

## 2021-03-25 RX ADMIN — LIDOCAINE HYDROCHLORIDE 200 MG: 20 INJECTION, SOLUTION EPIDURAL; INFILTRATION; INTRACAUDAL; PERINEURAL at 12:36

## 2021-03-25 RX ADMIN — GLYCOPYRROLATE 0.1 MG: 0.2 INJECTION, SOLUTION INTRAMUSCULAR; INTRAVENOUS at 12:33

## 2021-03-25 RX ADMIN — SODIUM CHLORIDE 500 ML: 9 INJECTION, SOLUTION INTRAVENOUS at 11:46

## 2021-03-25 RX ADMIN — PROPOFOL 200 MG: 10 INJECTION, EMULSION INTRAVENOUS at 12:36

## 2021-03-25 NOTE — ANESTHESIA POSTPROCEDURE EVALUATION
"Patient: Jae Guevara    Procedure Summary     Date: 03/25/21 Room / Location:  [facility] /  [facility]    Anesthesia Start: 1232 Anesthesia Stop: 1246    Procedure: ESOPHAGOGASTRODUODENOSCOPY WITH ANESTHESIA (N/A ) Diagnosis:       Cabrera's esophagus without dysplasia      Pharyngoesophageal dysphagia      Pain of upper abdomen      (Cabrera's esophagus without dysplasia [K22.70])      (Pharyngoesophageal dysphagia [R13.14])      (Pain of upper abdomen [R10.10])    Surgeons: David Portillo MD Provider: Bony Christiansen CRNA    Anesthesia Type: MAC ASA Status: 2          Anesthesia Type: MAC    Vitals  No vitals data found for the desired time range.          Post Anesthesia Care and Evaluation    Patient location during evaluation: PHASE II  Patient participation: waiting for patient participation  Level of consciousness: responsive to light touch  Pain score: 0  Pain management: adequate  Airway patency: patent  Anesthetic complications: No anesthetic complications  PONV Status: none  Cardiovascular status: acceptable  Respiratory status: acceptable  Hydration status: acceptable    Comments: Blood pressure 132/90, pulse 64, temperature 97.2 °F (36.2 °C), temperature source Temporal, resp. rate 20, height 177.8 cm (70\"), weight 88.9 kg (196 lb), SpO2 97 %.    Pt discharged from PACU based on desiree score >8      "

## 2021-03-25 NOTE — ANESTHESIA PREPROCEDURE EVALUATION
Anesthesia Evaluation     Patient summary reviewed   no history of anesthetic complications:  NPO Solid Status: > 8 hours  NPO Liquid Status: > 8 hours           Airway   Mallampati: II  TM distance: >3 FB  Neck ROM: full  No difficulty expected  Dental - normal exam     Pulmonary    (+) a smoker Current Smoked day of surgery,   (-) COPD, asthma, sleep apnea  Cardiovascular   Exercise tolerance: excellent (>7 METS)    (-) hypertension, past MI, CAD, dysrhythmias, cardiac stents, hyperlipidemia      Neuro/Psych  (-) TIA, CVA  GI/Hepatic/Renal/Endo    (+)  GERD,  renal disease stones,   (-) liver disease, diabetes    Musculoskeletal     Abdominal    Substance History      OB/GYN          Other                        Anesthesia Plan    ASA 2     MAC     intravenous induction     Anesthetic plan, all risks, benefits, and alternatives have been provided, discussed and informed consent has been obtained with: patient.

## 2021-03-26 LAB
CYTO UR: NORMAL
LAB AP CASE REPORT: NORMAL
PATH REPORT.FINAL DX SPEC: NORMAL
PATH REPORT.GROSS SPEC: NORMAL

## 2021-06-01 RX ORDER — DICYCLOMINE HYDROCHLORIDE 10 MG/1
CAPSULE ORAL
Qty: 30 CAPSULE | Refills: 1 | Status: SHIPPED | OUTPATIENT
Start: 2021-06-01 | End: 2021-06-29 | Stop reason: SDUPTHER

## 2021-06-29 DIAGNOSIS — R10.10 PAIN OF UPPER ABDOMEN: Primary | ICD-10-CM

## 2021-07-01 RX ORDER — DICYCLOMINE HYDROCHLORIDE 10 MG/1
10 CAPSULE ORAL
Qty: 30 CAPSULE | Refills: 3 | Status: SHIPPED | OUTPATIENT
Start: 2021-07-01 | End: 2021-08-30 | Stop reason: SDUPTHER

## 2021-08-30 DIAGNOSIS — R10.10 PAIN OF UPPER ABDOMEN: ICD-10-CM

## 2021-09-01 RX ORDER — DICYCLOMINE HYDROCHLORIDE 10 MG/1
10 CAPSULE ORAL EVERY 6 HOURS PRN
Qty: 30 CAPSULE | Refills: 7 | Status: SHIPPED | OUTPATIENT
Start: 2021-09-01

## 2022-07-06 DIAGNOSIS — R10.10 PAIN OF UPPER ABDOMEN: ICD-10-CM

## 2022-07-07 RX ORDER — DICYCLOMINE HYDROCHLORIDE 10 MG/1
CAPSULE ORAL
Qty: 30 CAPSULE | Refills: 3 | OUTPATIENT
Start: 2022-07-07

## 2022-11-17 LAB
ALBUMIN SERPL-MCNC: 4.5 G/DL (ref 3.5–5.2)
ALP BLD-CCNC: 83 U/L (ref 40–130)
ALT SERPL-CCNC: 30 U/L (ref 5–41)
ANION GAP SERPL CALCULATED.3IONS-SCNC: 10 MMOL/L (ref 7–19)
AST SERPL-CCNC: 27 U/L (ref 5–40)
BILIRUB SERPL-MCNC: 0.7 MG/DL (ref 0.2–1.2)
BUN BLDV-MCNC: 15 MG/DL (ref 6–20)
CALCIUM SERPL-MCNC: 9.8 MG/DL (ref 8.6–10)
CHLORIDE BLD-SCNC: 106 MMOL/L (ref 98–111)
CO2: 25 MMOL/L (ref 22–29)
CREAT SERPL-MCNC: 1 MG/DL (ref 0.5–1.2)
GFR SERPL CREATININE-BSD FRML MDRD: >60 ML/MIN/{1.73_M2}
GLUCOSE BLD-MCNC: 117 MG/DL (ref 74–109)
POTASSIUM SERPL-SCNC: 3.8 MMOL/L (ref 3.5–5)
SODIUM BLD-SCNC: 141 MMOL/L (ref 136–145)
TOTAL PROTEIN: 7.5 G/DL (ref 6.6–8.7)

## 2023-01-09 NOTE — PROGRESS NOTES
[facility] - PODIATRY    Today's Date: 01/13/2023     Patient Name: Jae Guevara  MRN: [id number]  CSN: [id number]  PCP: Elmer Montague MD  Referring Provider: Elmer Montague MD    SUBJECTIVE     Chief Complaint   Patient presents with   • hospitals Care     Elmer Montague MD-10/18/2022-NEW PATIENT - INGROWING TOENAIL- pt states since June both great nails been feeling like ingrowing, has worked on them and keeping them from ingrowing- pt denies pain, more irritation     HPI: Jae Guevara, a 52 y.o.male, comes to clinic as a(n) new patient complaining of ingrown toenail and complaining of painful toenails. Patient has h/o anxiety, barretts esophagus, dysthymia, GERD, IBS, IGTN. Patient presents with complaints of recurrent, painful IGTN of both hallux nails. States that he walks with his toe up and notes that it often gets caught on shoe, especially when wearing composite toe shoes. Hallux nail is chronically ingrowing and he states that he has previously trimmed out the area with clippers but due to recurrence, he wants to have something more permanent done. Denies pain today, reports irritation. Relates previous treatment(s) including self trimming of offending nail borders. Denies any constitutional symptoms. No other pedal complaints at this time.    Past Medical History:   Diagnosis Date   • Anxiety    • Cabrera's esophagus    • Dysthymia    • GERD (gastroesophageal reflux disease)    • Hiatal hernia    • High arches Birth   • IBS (irritable bowel syndrome)    • Ingrown toenail June 2022   • Nephrolithiasis    • Schatzki's ring    • Verruca      Past Surgical History:   Procedure Laterality Date   • BACK SURGERY      x 2   • CERVICAL FUSION     • COLONOSCOPY N/A 11/09/2017    Procedure: COLONOSCOPY WITH ANESTHESIA;  Surgeon: David Portillo MD;  Location: [facility] ENDOSCOPY;  Service:    • ENDOSCOPY  08/20/2014   • ENDOSCOPY N/A 11/09/2017    Procedure:  ESOPHAGOGASTRODUODENOSCOPY WITH ANESTHESIA;  Surgeon: David Portillo MD;  Location: [facility] ENDOSCOPY;  Service:    • ENDOSCOPY N/A 2018    Procedure: ESOPHAGOGASTRODUODENOSCOPY WITH ANESTHESIA;  Surgeon: David Portillo MD;  Location: [facility] ENDOSCOPY;  Service:    • ENDOSCOPY N/A 2021    Procedure: ESOPHAGOGASTRODUODENOSCOPY WITH ANESTHESIA;  Surgeon: David Portillo MD;  Location: [facility] ENDOSCOPY;  Service: Gastroenterology;  Laterality: N/A;  pre dysphagia; singh's; abdominal pain  post dilation; barretts  Elmer Montague MD   • ENDOSCOPY AND COLONOSCOPY  2012   • OTHER SURGICAL HISTORY      Lithotripsy x 2   • TONSILLECTOMY AND ADENOIDECTOMY       Family History   Problem Relation Age of Onset   • Colon polyps Paternal Uncle         in his 50's   • Colon cancer Paternal Grandfather         in his 70's.    • Colon polyps Paternal Aunt         in her 50's    • Colon polyps Paternal Uncle         in his 50's.    • Colon polyps Paternal Aunt         in her 50's    • Cancer Sister          2000     Social History     Socioeconomic History   • Marital status:    Tobacco Use   • Smoking status: Every Day     Packs/day: 2.00     Years: 10.00     Pack years: 20.00     Types: Cigarettes   • Smokeless tobacco: Never   Vaping Use   • Vaping Use: Some days   • Substances: THC, CBD, Flavoring   • Devices: Pre-filled or refillable cartridge   Substance and Sexual Activity   • Alcohol use: No   • Drug use: No   • Sexual activity: Yes     Partners: Female     Birth control/protection: Same-sex partner     No Known Allergies  Current Outpatient Medications   Medication Sig Dispense Refill   • acetaminophen (TYLENOL) 500 MG tablet Take 1,000 mg by mouth Every 8 (Eight) Hours As Needed for Mild Pain .     • cholecalciferol (VITAMIN D3) 1000 units tablet Take 1,000 Units by mouth Daily.     • cyclobenzaprine (FLEXERIL) 10 MG tablet Take 10 mg by mouth 3 (Three) Times a Day As  Needed for Muscle Spasms.     • dexmethylphenidate (FOCALIN) 10 MG tablet Take 10 mg by mouth 2 (Two) Times a Day.     • diazePAM (VALIUM) 10 MG tablet Take 10 mg by mouth 2 (Two) Times a Day As Needed for Anxiety.     • dicyclomine (BENTYL) 10 MG capsule Take 1 capsule by mouth Every 6 (Six) Hours As Needed (abdominal pain). 30 capsule 7   • ibuprofen (ADVIL,MOTRIN) 800 MG tablet Take 800 mg by mouth 2 (two) times a day.     • omeprazole (priLOSEC) 20 MG capsule Take 1 capsule by mouth 2 (Two) Times a Day Before Meals. 60 capsule 2   • pregabalin (LYRICA) 150 MG capsule Take 150 mg by mouth 2 (Two) Times a Day.     • rosuvastatin (CRESTOR) 5 MG tablet Take 5 mg by mouth Daily.     • rifAMPin (RIFADIN) 150 MG capsule Take  by mouth 2 (Two) Times a Day.     • sulfamethoxazole-trimethoprim (BACTRIM,SEPTRA) 400-80 MG tablet Take 1 tablet by mouth 2 (Two) Times a Day.     • tiZANidine (ZANAFLEX) 4 MG tablet 1 (ONE) TABLET BY MOUTH EVERY 8 HOURS AS NEEDED FOR MUSCLE SPASMS. HOLD FOR OVERSEDATION/CONFUSION  0     No current facility-administered medications for this visit.     Review of Systems   Constitutional: Negative for chills and fever.   HENT: Negative for congestion.    Respiratory: Negative for shortness of breath.    Cardiovascular: Negative for chest pain and leg swelling.   Gastrointestinal: Negative for constipation, diarrhea, nausea and vomiting.   Musculoskeletal: Positive for arthralgias. Negative for myalgias.   Skin: Negative for wound.        IGTN   Neurological: Negative for numbness.       OBJECTIVE     Vitals:    01/13/23 1108   BP: 168/98   Pulse: 115   SpO2: 97%       PHYSICAL EXAM  GEN:   Accompanied by none.     Foot/Ankle Exam:       General:   Appearance: appears stated age and healthy    Orientation: AAOx3    Affect: appropriate    Gait: unimpaired    Assistance: independent    Shoe Gear:  Casual shoes    VASCULAR      Right Foot Vascularity   Dorsalis pedis:  2+  Posterior tibial:  2+  Skin  Temperature: warm    Edema Grading:  None  CFT:  3  Pedal Hair Growth:  Present  Varicosities: none       Left Foot Vascularity   Dorsalis pedis:  2+  Posterior tibial:  2+  Skin Temperature: warm    Edema Grading:  None  CFT:  3  Pedal Hair Growth:  Present  Varicosities: none        NEUROLOGIC     Right Foot Neurologic   Normal sensation    Light touch sensation:  Normal  Vibratory sensation:  Normal  Hot/Cold sensation: normal    Protective Sensation using [place] Monofilament:  10     Left Foot Neurologic   Normal sensation    Light touch sensation:  Normal  Vibratory sensation:  Normal  Hot/cold sensation: normal    Protective Sensation using [place] Monofilament:  10     MUSCULOSKELETAL      Right Foot Musculoskeletal   Ecchymosis:  None  Tenderness: toenails and toe 1    Arch:  Normal     Left Foot Musculoskeletal   Ecchymosis:  None  Tenderness: toenails and toe 1    Arch:  Normal     MUSCLE STRENGTH     Right Foot Muscle Strength   Foot dorsiflexion:  5  Foot plantar flexion:  5  Foot inversion:  5  Foot eversion:  5     Left Foot Muscle Strength   Foot dorsiflexion:  5  Foot plantar flexion:  5  Foot inversion:  5  Foot eversion:  5     RANGE OF MOTION      Right Foot Range of Motion   Foot and ankle ROM within normal limits       Left Foot Range of Motion   Foot and ankle ROM within normal limits       DERMATOLOGIC     Right Foot Dermatologic   Skin: skin intact    Nails: abnormally thick, dystrophic nails and ingrown toenail (hallux medial border)       Left Foot Dermatologic   Skin: skin intact    Nails: abnormally thick, dystrophic nails and ingrown toenail (hallux medial border)        RADIOLOGY/NUCLEAR:  No results found.    LABORATORY/CULTURE RESULTS:      PATHOLOGY RESULTS:       ASSESSMENT/PLAN     Diagnoses and all orders for this visit:    1. Ingrown toenail of both feet (Primary)  -     Nail Removal  -     Nail Removal  -     lidocaine (XYLOCAINE) 1 % injection 5 mL  -      lidocaine (XYLOCAINE) 1 % injection 5 mL    2. Foot pain, bilateral    3. Nail dystrophy      Comprehensive lower extremity examination and evaluation was performed.  Discussed findings and treatment plan including risks, benefits, and treatment options with patient in detail. Patient agreed with treatment plan.  Given chronicity of issues with nails with recurrent IGTN, I feel that the best course of action would be to proceed with PNA with matrixectomy of offending nail border. This was discussed with treatment who agrees with treatment plan.   After written consent obtained, total/partial nail avulsion(s) performed as documented in procedure note. Post-procedure instructions given.    An After Visit Summary was printed and given to the patient at discharge, including (if requested) any available informative/educational handouts regarding diagnosis, treatment, or medications. All questions were answered to patient/family satisfaction. Should symptoms fail to improve or worsen they agree to call or return to clinic or to go to the Emergency Department. Discussed the importance of following up with any needed screening tests/labs/specialist appointments and any requested follow-up recommended by me today. Importance of maintaining follow-up discussed and patient accepts that missed appointments can delay diagnosis and potentially lead to worsening of conditions.  Return in about 2 weeks (around 1/27/2023)., or sooner if acute issues arise.    Nail Removal    Date/Time: 1/13/2023 11:37 AM  Performed by: Yoandy Lemon APRN  Authorized by: Yoandy Lemon APRN   Location: left foot  Location details: left big toe  Anesthesia: digital block    Anesthesia:  Local Anesthetic: lidocaine 1% without epinephrine  Anesthetic total: 5 mL    Sedation:  Patient sedated: no    Preparation: skin prepped with Betadine  Amount removed: partial  Side: medial  Wedge excision of skin of nail fold: no  Nail bed sutured:  no  Nail matrix removed: partial (phenol)  Removed nail replaced and anchored: no  Dressing: 4x4, antibiotic ointment and gauze roll  Patient tolerance: patient tolerated the procedure well with no immediate complications  Comments: Flushed with alcohol. Silvadene applied.     Nail Removal    Date/Time: 1/13/2023 11:37 AM  Performed by: Yoandy Lemon APRN  Authorized by: Yoandy Lemon APRN   Location: right foot  Location details: right big toe  Anesthesia: digital block    Anesthesia:  Local Anesthetic: lidocaine 1% without epinephrine  Anesthetic total: 5 mL    Sedation:  Patient sedated: no    Preparation: skin prepped with Betadine  Amount removed: partial  Side: medial  Wedge excision of skin of nail fold: no  Nail bed sutured: no  Nail matrix removed: partial (phenol)  Removed nail replaced and anchored: no  Dressing: antibiotic ointment, gauze roll and 4x4  Patient tolerance: patient tolerated the procedure well with no immediate complications  Comments: Flushed with alcohol. Silvadene applied.           Lab Frequency Next Occurrence   Follow Anesthesia Guidelines / Protocol Once 03/16/2021   Obtain Informed Consent Once 03/21/2021       This document has been electronically signed by LAMINE Morelos on January 13, 2023 11:40 CST

## 2023-01-12 ENCOUNTER — TELEPHONE (OUTPATIENT)
Dept: PODIATRY | Facility: CLINIC | Age: 53
End: 2023-01-12
Payer: COMMERCIAL

## 2023-01-13 ENCOUNTER — OFFICE VISIT (OUTPATIENT)
Dept: PODIATRY | Facility: CLINIC | Age: 53
End: 2023-01-13
Payer: COMMERCIAL

## 2023-01-13 VITALS
HEIGHT: 70 IN | BODY MASS INDEX: 29.06 KG/M2 | DIASTOLIC BLOOD PRESSURE: 98 MMHG | OXYGEN SATURATION: 97 % | SYSTOLIC BLOOD PRESSURE: 168 MMHG | WEIGHT: 203 LBS | HEART RATE: 115 BPM

## 2023-01-13 DIAGNOSIS — M79.671 FOOT PAIN, BILATERAL: ICD-10-CM

## 2023-01-13 DIAGNOSIS — L60.3 NAIL DYSTROPHY: ICD-10-CM

## 2023-01-13 DIAGNOSIS — M79.672 FOOT PAIN, BILATERAL: ICD-10-CM

## 2023-01-13 DIAGNOSIS — L60.0 INGROWN TOENAIL OF BOTH FEET: Primary | ICD-10-CM

## 2023-01-13 PROCEDURE — 99213 OFFICE O/P EST LOW 20 MIN: CPT | Performed by: NURSE PRACTITIONER

## 2023-01-13 PROCEDURE — 11750 EXCISION NAIL&NAIL MATRIX: CPT | Performed by: NURSE PRACTITIONER

## 2023-01-13 RX ORDER — LIDOCAINE HYDROCHLORIDE 10 MG/ML
5 INJECTION, SOLUTION INFILTRATION; PERINEURAL ONCE
Status: COMPLETED | OUTPATIENT
Start: 2023-01-13 | End: 2023-01-13

## 2023-01-13 RX ORDER — CYCLOBENZAPRINE HCL 10 MG
10 TABLET ORAL 3 TIMES DAILY PRN
COMMUNITY

## 2023-01-13 RX ORDER — ROSUVASTATIN CALCIUM 5 MG/1
5 TABLET, COATED ORAL DAILY
COMMUNITY

## 2023-01-13 RX ADMIN — LIDOCAINE HYDROCHLORIDE 5 ML: 10 INJECTION, SOLUTION INFILTRATION; PERINEURAL at 11:25

## 2023-01-13 NOTE — PATIENT INSTRUCTIONS
Nail Avulsion  Nail avulsion is when a nail tears away from the nail bed due to an accident or injury. Nail avulsion can be painful. Your finger or toe may bleed a lot, and you may have some pain, redness, throbbing, and swelling while it heals. Your nail will grow back within several months. Once it grows back, it might not look the same as the old nail. This may happen even after taking good care of it.  Follow these instructions at home:  Wound care  Follow instructions from your health care provider about how to take care of your wound. Make sure you:  Wash your hands with soap and water before and after you change your bandage (dressing). If soap and water are not available, use hand .  Change your dressing as told by your health care provider.  Leave stitches (sutures), skin glue, or adhesive strips in place, if present. These skin closures may need to stay in place for 2 weeks or longer. If adhesive strip edges start to loosen and curl up, you may trim the loose edges. Do not remove adhesive strips completely unless your health care provider tells you to do that.  Check your wound every day for signs of infection. Check for:  Redness, swelling, or pain.  Fluid or blood.  Warmth.  Pus or a bad smell.  Do not take baths, swim, or use a hot tub until your health care provider approves. Ask your health care provider if you may take showers. You may only be allowed to take sponge baths.  If you notice bleeding, press gently on the nail bed with a gauze pad. Do this for 15 minutes.  Keep the wound dry for 48 hours. After 48 hours have passed, lightly wash the finger or toe in warm, soapy water 2-3 times a day. This helps to reduce pain and swelling. It also prevents infection.  Medicine  Take over-the-counter and prescription medicines only as told by your health care provider. Do not take aspirin or products containing aspirin unless directed by your health care provider. These products can increase  bleeding.  If you were prescribed an antibiotic medicine, take it or apply it as told by your health care provider. Do not stop taking or using the antibiotic even if you start to feel better.  General instructions    Keep the injured hand or foot raised above the level of your heart as much as possible in the first 48 hours after the injury. This helps to reduce pain and swelling.  Move the toe or finger often to avoid stiffness.  Do not use any products that contain nicotine or tobacco, such as cigarettes, e-cigarettes, and chewing tobacco. These can delay healing. If you need help quitting, ask your health care provider.  Contact a health care provider if:  You have increased redness, swelling, or pain around your wound.  You have fluid or blood coming from your wound.  You have pus or a bad smell coming from your wound.  Your wound or the area around your wound feels warm to the touch.  Get help right away if:  You have bleeding that does not stop, even when you apply pressure to the wound.  You have a temperature that is higher than 100.4°F (38°C).  The affected finger or toe looks white or black.  Summary  Nail avulsion is when a nail tears away from the nail bed due to an accident or injury.  Follow instructions from your health care provider about how to take care of your wound.  Keep the injured hand or foot raised above the level of your heart as much as possible in the first 48 hours after the injury. This helps to reduce pain and swelling.  Check your wound every day for signs of infection.  Contact a health care provider if you have increased redness, swelling, or pain around your wound.  This information is not intended to replace advice given to you by your health care provider. Make sure you discuss any questions you have with your health care provider.  Document Revised: 10/13/2022 Document Reviewed: 10/13/2022  Elsevier Patient Education © 2022 [employer]

## 2023-01-17 NOTE — PROGRESS NOTES
[facility] - PODIATRY    Today's Date: 01/26/2023     Patient Name: Jae Guevara  MRN: [id number]  CSN: [id number]  PCP: Elmer Montague MD  Referring Provider: No ref. provider found    SUBJECTIVE     Chief Complaint   Patient presents with   • Follow-up     Elmer Montague MD-10/18/2022-2 week fu- pt states 2 week f/u amor nail avulsion, doing great- pt denies pain     HPI: Jae Guevara, a 52 y.o.male, comes to clinic as a(n) established patient for follow-up treatment of PNA of bilateral hallux. Patient has h/o anxiety, barretts esophagus, dysthymia, GERD, IBS, IGTN. Patient presents now 2 weeks post partial nail avulsion of bilateral hallux along the medial border.  States that he has been applying triple antibiotic ointment every other day and then utilizing peroxide every other day.  Denies any ongoing issues related to nail avulsions.  Continues to have some mild drainage. Denies pain today. Relates previous treatment(s) including self trimming of offending nail borders, PNA. Denies any constitutional symptoms. No other pedal complaints at this time.    Past Medical History:   Diagnosis Date   • Anxiety    • Cabrera's esophagus    • Dysthymia    • GERD (gastroesophageal reflux disease)    • Hiatal hernia    • High arches Birth   • IBS (irritable bowel syndrome)    • Ingrown toenail June 2022   • Nephrolithiasis    • Schatzki's ring    • Verruca      Past Surgical History:   Procedure Laterality Date   • BACK SURGERY      x 2   • CERVICAL FUSION     • COLONOSCOPY N/A 11/09/2017    Procedure: COLONOSCOPY WITH ANESTHESIA;  Surgeon: David Portillo MD;  Location: [facility] ENDOSCOPY;  Service:    • ENDOSCOPY  08/20/2014   • ENDOSCOPY N/A 11/09/2017    Procedure: ESOPHAGOGASTRODUODENOSCOPY WITH ANESTHESIA;  Surgeon: David Portillo MD;  Location: [facility] ENDOSCOPY;  Service:    • ENDOSCOPY N/A 02/12/2018    Procedure: ESOPHAGOGASTRODUODENOSCOPY WITH ANESTHESIA;  Surgeon: David SIMMS  MD Bandar;  Location: [facility] ENDOSCOPY;  Service:    • ENDOSCOPY N/A 2021    Procedure: ESOPHAGOGASTRODUODENOSCOPY WITH ANESTHESIA;  Surgeon: David Portillo MD;  Location: [facility] ENDOSCOPY;  Service: Gastroenterology;  Laterality: N/A;  pre dysphagia; singh's; abdominal pain  post dilation; barretts  Elmer Montague MD   • ENDOSCOPY AND COLONOSCOPY  2012   • OTHER SURGICAL HISTORY      Lithotripsy x 2   • TONSILLECTOMY AND ADENOIDECTOMY       Family History   Problem Relation Age of Onset   • Colon polyps Paternal Uncle         in his 50's   • Colon cancer Paternal Grandfather         in his 70's.    • Colon polyps Paternal Aunt         in her 50's    • Colon polyps Paternal Uncle         in his 50's.    • Colon polyps Paternal Aunt         in her 50's    • Cancer Sister          2000     Social History     Socioeconomic History   • Marital status:    Tobacco Use   • Smoking status: Every Day     Packs/day: 2.00     Years: 10.00     Pack years: 20.00     Types: Cigarettes   • Smokeless tobacco: Never   Vaping Use   • Vaping Use: Some days   • Substances: THC, CBD, Flavoring   • Devices: Pre-filled or refillable cartridge   Substance and Sexual Activity   • Alcohol use: No   • Drug use: No   • Sexual activity: Not Currently     Partners: Female     Birth control/protection: Abstinence     No Known Allergies  Current Outpatient Medications   Medication Sig Dispense Refill   • cyclobenzaprine (FLEXERIL) 10 MG tablet Take 10 mg by mouth 3 (Three) Times a Day As Needed for Muscle Spasms.     • dexmethylphenidate (FOCALIN) 10 MG tablet Take 10 mg by mouth 2 (Two) Times a Day.     • diazePAM (VALIUM) 10 MG tablet Take 10 mg by mouth 2 (Two) Times a Day As Needed for Anxiety.     • dicyclomine (BENTYL) 10 MG capsule Take 1 capsule by mouth Every 6 (Six) Hours As Needed (abdominal pain). 30 capsule 7   • ibuprofen (ADVIL,MOTRIN) 800 MG tablet Take 800 mg by mouth 2 (two) times a  day.     • omeprazole (priLOSEC) 20 MG capsule Take 1 capsule by mouth 2 (Two) Times a Day Before Meals. 60 capsule 2   • pregabalin (LYRICA) 150 MG capsule Take 150 mg by mouth 2 (Two) Times a Day.     • rosuvastatin (CRESTOR) 5 MG tablet Take 5 mg by mouth Daily.     • acetaminophen (TYLENOL) 500 MG tablet Take 1,000 mg by mouth Every 8 (Eight) Hours As Needed for Mild Pain .     • cholecalciferol (VITAMIN D3) 1000 units tablet Take 1,000 Units by mouth Daily.     • rifAMPin (RIFADIN) 150 MG capsule Take  by mouth 2 (Two) Times a Day.     • sulfamethoxazole-trimethoprim (BACTRIM,SEPTRA) 400-80 MG tablet Take 1 tablet by mouth 2 (Two) Times a Day.     • tiZANidine (ZANAFLEX) 4 MG tablet 1 (ONE) TABLET BY MOUTH EVERY 8 HOURS AS NEEDED FOR MUSCLE SPASMS. HOLD FOR OVERSEDATION/CONFUSION  0     No current facility-administered medications for this visit.     Review of Systems   Constitutional: Negative for chills and fever.   HENT: Negative for congestion.    Respiratory: Negative for shortness of breath.    Cardiovascular: Negative for chest pain and leg swelling.   Gastrointestinal: Negative for constipation, diarrhea, nausea and vomiting.   Musculoskeletal: Negative for arthralgias and myalgias.   Skin: Negative for wound.   Neurological: Negative for numbness.       OBJECTIVE     Vitals:    01/26/23 1308   BP: 136/74   Pulse: 80   SpO2: 98%       PHYSICAL EXAM  GEN:   Accompanied by none.     Foot/Ankle Exam:       General:   Appearance: appears stated age and healthy    Orientation: AAOx3    Affect: appropriate    Gait: unimpaired    Assistance: independent    Shoe Gear:  Casual shoes    VASCULAR      Right Foot Vascularity   Dorsalis pedis:  2+  Posterior tibial:  2+  Skin Temperature: warm    Edema Grading:  None  CFT:  3  Pedal Hair Growth:  Present  Varicosities: none       Left Foot Vascularity   Dorsalis pedis:  2+  Posterior tibial:  2+  Skin Temperature: warm    Edema Grading:  None  CFT:  3  Pedal Hair  Growth:  Present  Varicosities: none        NEUROLOGIC     Right Foot Neurologic   Normal sensation    Light touch sensation:  Normal  Vibratory sensation:  Normal  Hot/Cold sensation: normal    Protective Sensation using [place] Monofilament:  10     Left Foot Neurologic   Normal sensation    Light touch sensation:  Normal  Vibratory sensation:  Normal  Hot/cold sensation: normal    Protective Sensation using [place] Monofilament:  10     MUSCULOSKELETAL      Right Foot Musculoskeletal   Ecchymosis:  None  Tenderness: none    Arch:  Normal     Left Foot Musculoskeletal   Ecchymosis:  None  Tenderness: none    Arch:  Normal     MUSCLE STRENGTH     Right Foot Muscle Strength   Foot dorsiflexion:  5  Foot plantar flexion:  5  Foot inversion:  5  Foot eversion:  5     Left Foot Muscle Strength   Foot dorsiflexion:  5  Foot plantar flexion:  5  Foot inversion:  5  Foot eversion:  5     RANGE OF MOTION      Right Foot Range of Motion   Foot and ankle ROM within normal limits       Left Foot Range of Motion   Foot and ankle ROM within normal limits       DERMATOLOGIC     Right Foot Dermatologic   Skin: skin intact    Nails: abnormally thick and dystrophic nails    Nails: no ingrown toenail       Left Foot Dermatologic   Skin: skin intact    Nails: abnormally thick and dystrophic nails    Nails: no ingrown toenail        RADIOLOGY/NUCLEAR:  No results found.    LABORATORY/CULTURE RESULTS:      PATHOLOGY RESULTS:       ASSESSMENT/PLAN     Diagnoses and all orders for this visit:    1. Nail avulsion of toe, subsequent encounter (Primary)      Comprehensive lower extremity examination and evaluation was performed.  Discussed findings and treatment plan including risks, benefits, and treatment options with patient in detail. Patient agreed with treatment plan.   Avulsion site is healing as expected.  Continue application of Band-Aid and triple antibiotic ointment until no drainage is present   An After Visit  Summary was printed and given to the patient at discharge, including (if requested) any available informative/educational handouts regarding diagnosis, treatment, or medications. All questions were answered to patient/family satisfaction. Should symptoms fail to improve or worsen they agree to call or return to clinic or to go to the Emergency Department. Discussed the importance of following up with any needed screening tests/labs/specialist appointments and any requested follow-up recommended by me today. Importance of maintaining follow-up discussed and patient accepts that missed appointments can delay diagnosis and potentially lead to worsening of conditions.  Return if symptoms worsen or fail to improve., or sooner if acute issues arise.    Procedures    Lab Frequency Next Occurrence   Follow Anesthesia Guidelines / Protocol Once 03/16/2021   Obtain Informed Consent Once 03/21/2021       This document has been electronically signed by LAMINE Morelos on January 26, 2023 13:17 CST

## 2023-01-25 ENCOUNTER — TELEPHONE (OUTPATIENT)
Dept: PODIATRY | Facility: CLINIC | Age: 53
End: 2023-01-25
Payer: COMMERCIAL

## 2023-01-26 ENCOUNTER — OFFICE VISIT (OUTPATIENT)
Dept: PODIATRY | Facility: CLINIC | Age: 53
End: 2023-01-26
Payer: COMMERCIAL

## 2023-01-26 VITALS
HEART RATE: 80 BPM | BODY MASS INDEX: 28.77 KG/M2 | OXYGEN SATURATION: 98 % | HEIGHT: 70 IN | SYSTOLIC BLOOD PRESSURE: 136 MMHG | DIASTOLIC BLOOD PRESSURE: 74 MMHG | WEIGHT: 201 LBS

## 2023-01-26 DIAGNOSIS — S91.209D NAIL AVULSION OF TOE, SUBSEQUENT ENCOUNTER: Primary | ICD-10-CM

## 2023-01-26 PROCEDURE — 99212 OFFICE O/P EST SF 10 MIN: CPT | Performed by: NURSE PRACTITIONER

## 2023-08-04 ENCOUNTER — APPOINTMENT (OUTPATIENT)
Dept: CT IMAGING | Age: 53
End: 2023-08-04
Payer: COMMERCIAL

## 2023-08-04 ENCOUNTER — ANESTHESIA EVENT (OUTPATIENT)
Dept: OPERATING ROOM | Age: 53
End: 2023-08-04
Payer: COMMERCIAL

## 2023-08-04 ENCOUNTER — HOSPITAL ENCOUNTER (OUTPATIENT)
Age: 53
Setting detail: OBSERVATION
Discharge: HOME | End: 2023-08-05
Attending: PEDIATRICS | Admitting: FAMILY MEDICINE
Payer: COMMERCIAL

## 2023-08-04 ENCOUNTER — APPOINTMENT (OUTPATIENT)
Dept: GENERAL RADIOLOGY | Age: 53
End: 2023-08-04
Payer: COMMERCIAL

## 2023-08-04 ENCOUNTER — ANESTHESIA (OUTPATIENT)
Dept: OPERATING ROOM | Age: 53
End: 2023-08-04
Payer: COMMERCIAL

## 2023-08-04 DIAGNOSIS — N20.1 URETEROLITHIASIS: Primary | ICD-10-CM

## 2023-08-04 DIAGNOSIS — N13.9 ACUTE UNILATERAL OBSTRUCTIVE UROPATHY: ICD-10-CM

## 2023-08-04 DIAGNOSIS — N28.9 ACUTE RENAL INSUFFICIENCY: ICD-10-CM

## 2023-08-04 DIAGNOSIS — N13.2 HYDRONEPHROSIS WITH RENAL AND URETERAL CALCULUS OBSTRUCTION: ICD-10-CM

## 2023-08-04 PROBLEM — M43.24 ANKYLOSIS OF THORACIC SPINE: Status: ACTIVE | Noted: 2019-06-19

## 2023-08-04 PROBLEM — K22.70 BARRETT'S ESOPHAGUS WITHOUT DYSPLASIA: Status: ACTIVE | Noted: 2017-10-24

## 2023-08-04 PROBLEM — F90.0 ATTENTION DEFICIT HYPERACTIVITY DISORDER, PREDOMINANTLY INATTENTIVE TYPE: Status: ACTIVE | Noted: 2019-06-19

## 2023-08-04 PROBLEM — E78.2 MIXED HYPERLIPIDEMIA: Status: ACTIVE | Noted: 2019-06-19

## 2023-08-04 PROBLEM — F41.9 ANXIETY: Status: ACTIVE | Noted: 2019-06-19

## 2023-08-04 PROBLEM — M50.90 CERVICAL DISC DISORDER: Status: ACTIVE | Noted: 2020-06-05

## 2023-08-04 LAB
ALBUMIN SERPL-MCNC: 3.9 G/DL (ref 3.5–5.2)
ALP SERPL-CCNC: 77 U/L (ref 40–130)
ALT SERPL-CCNC: 17 U/L (ref 5–41)
ANION GAP SERPL CALCULATED.3IONS-SCNC: 9 MMOL/L (ref 7–19)
AST SERPL-CCNC: 24 U/L (ref 5–40)
BACTERIA URNS QL MICRO: NEGATIVE /HPF
BASOPHILS # BLD: 0.1 K/UL (ref 0–0.2)
BASOPHILS NFR BLD: 0.5 % (ref 0–1)
BILIRUB SERPL-MCNC: 0.3 MG/DL (ref 0.2–1.2)
BILIRUB UR QL STRIP: NEGATIVE
BUN SERPL-MCNC: 17 MG/DL (ref 6–20)
CALCIUM SERPL-MCNC: 9.5 MG/DL (ref 8.6–10)
CHLORIDE SERPL-SCNC: 105 MMOL/L (ref 98–111)
CLARITY UR: CLEAR
CO2 SERPL-SCNC: 28 MMOL/L (ref 22–29)
COLOR UR: YELLOW
CREAT SERPL-MCNC: 1.6 MG/DL (ref 0.5–1.2)
CRYSTALS URNS MICRO: ABNORMAL /HPF
EOSINOPHIL # BLD: 0.3 K/UL (ref 0–0.6)
EOSINOPHIL NFR BLD: 2.5 % (ref 0–5)
EPI CELLS #/AREA URNS AUTO: 1 /HPF (ref 0–5)
ERYTHROCYTE [DISTWIDTH] IN BLOOD BY AUTOMATED COUNT: 13.6 % (ref 11.5–14.5)
GLUCOSE BLD-MCNC: 187 MG/DL (ref 70–99)
GLUCOSE SERPL-MCNC: 106 MG/DL (ref 74–109)
GLUCOSE UR STRIP.AUTO-MCNC: NEGATIVE MG/DL
HCT VFR BLD AUTO: 43.1 % (ref 42–52)
HGB BLD-MCNC: 14.2 G/DL (ref 14–18)
HGB UR STRIP.AUTO-MCNC: ABNORMAL MG/L
HYALINE CASTS #/AREA URNS AUTO: 2 /HPF (ref 0–8)
IMM GRANULOCYTES # BLD: 0 K/UL
KETONES UR STRIP.AUTO-MCNC: ABNORMAL MG/DL
LEUKOCYTE ESTERASE UR QL STRIP.AUTO: ABNORMAL
LIPASE SERPL-CCNC: 27 U/L (ref 13–60)
LYMPHOCYTES # BLD: 1.4 K/UL (ref 1.1–4.5)
LYMPHOCYTES NFR BLD: 13.5 % (ref 20–40)
MCH RBC QN AUTO: 29.9 PG (ref 27–31)
MCHC RBC AUTO-ENTMCNC: 32.9 G/DL (ref 33–37)
MCV RBC AUTO: 90.7 FL (ref 80–94)
MONOCYTES # BLD: 1.2 K/UL (ref 0–0.9)
MONOCYTES NFR BLD: 11.4 % (ref 0–10)
NEUTROPHILS # BLD: 7.3 K/UL (ref 1.5–7.5)
NEUTS SEG NFR BLD: 71.8 % (ref 50–65)
NITRITE UR QL STRIP.AUTO: NEGATIVE
PERFORMED ON: ABNORMAL
PH UR STRIP.AUTO: 6 [PH] (ref 5–8)
PLATELET # BLD AUTO: 160 K/UL (ref 130–400)
PMV BLD AUTO: 12.4 FL (ref 9.4–12.4)
POTASSIUM SERPL-SCNC: 3.9 MMOL/L (ref 3.5–5)
PROT SERPL-MCNC: 6.8 G/DL (ref 6.6–8.7)
PROT UR STRIP.AUTO-MCNC: NEGATIVE MG/DL
RBC # BLD AUTO: 4.75 M/UL (ref 4.7–6.1)
RBC #/AREA URNS AUTO: 50 /HPF (ref 0–4)
SODIUM SERPL-SCNC: 142 MMOL/L (ref 136–145)
SP GR UR STRIP.AUTO: 1.02 (ref 1–1.03)
UROBILINOGEN UR STRIP.AUTO-MCNC: 1 E.U./DL
WBC # BLD AUTO: 10.1 K/UL (ref 4.8–10.8)
WBC #/AREA URNS AUTO: 5 /HPF (ref 0–5)

## 2023-08-04 PROCEDURE — 3700000001 HC ADD 15 MINUTES (ANESTHESIA): Performed by: UROLOGY

## 2023-08-04 PROCEDURE — 2580000003 HC RX 258: Performed by: PEDIATRICS

## 2023-08-04 PROCEDURE — 7100000000 HC PACU RECOVERY - FIRST 15 MIN: Performed by: UROLOGY

## 2023-08-04 PROCEDURE — G0378 HOSPITAL OBSERVATION PER HR: HCPCS

## 2023-08-04 PROCEDURE — 7100000001 HC PACU RECOVERY - ADDTL 15 MIN: Performed by: UROLOGY

## 2023-08-04 PROCEDURE — 85025 COMPLETE CBC W/AUTO DIFF WBC: CPT

## 2023-08-04 PROCEDURE — 87086 URINE CULTURE/COLONY COUNT: CPT

## 2023-08-04 PROCEDURE — 80053 COMPREHEN METABOLIC PANEL: CPT

## 2023-08-04 PROCEDURE — 96361 HYDRATE IV INFUSION ADD-ON: CPT

## 2023-08-04 PROCEDURE — 2500000003 HC RX 250 WO HCPCS: Performed by: NURSE ANESTHETIST, CERTIFIED REGISTERED

## 2023-08-04 PROCEDURE — C1758 CATHETER, URETERAL: HCPCS | Performed by: UROLOGY

## 2023-08-04 PROCEDURE — 83690 ASSAY OF LIPASE: CPT

## 2023-08-04 PROCEDURE — 6370000000 HC RX 637 (ALT 250 FOR IP): Performed by: UROLOGY

## 2023-08-04 PROCEDURE — 99284 EMERGENCY DEPT VISIT MOD MDM: CPT

## 2023-08-04 PROCEDURE — 82962 GLUCOSE BLOOD TEST: CPT

## 2023-08-04 PROCEDURE — 3600000004 HC SURGERY LEVEL 4 BASE: Performed by: UROLOGY

## 2023-08-04 PROCEDURE — 3600000014 HC SURGERY LEVEL 4 ADDTL 15MIN: Performed by: UROLOGY

## 2023-08-04 PROCEDURE — 6360000002 HC RX W HCPCS: Performed by: NURSE ANESTHETIST, CERTIFIED REGISTERED

## 2023-08-04 PROCEDURE — 81001 URINALYSIS AUTO W/SCOPE: CPT

## 2023-08-04 PROCEDURE — 6360000002 HC RX W HCPCS: Performed by: PEDIATRICS

## 2023-08-04 PROCEDURE — 2580000003 HC RX 258: Performed by: ANESTHESIOLOGY

## 2023-08-04 PROCEDURE — C2617 STENT, NON-COR, TEM W/O DEL: HCPCS | Performed by: UROLOGY

## 2023-08-04 PROCEDURE — 96374 THER/PROPH/DIAG INJ IV PUSH: CPT

## 2023-08-04 PROCEDURE — 6370000000 HC RX 637 (ALT 250 FOR IP): Performed by: FAMILY MEDICINE

## 2023-08-04 PROCEDURE — 96376 TX/PRO/DX INJ SAME DRUG ADON: CPT

## 2023-08-04 PROCEDURE — 96375 TX/PRO/DX INJ NEW DRUG ADDON: CPT

## 2023-08-04 PROCEDURE — 94760 N-INVAS EAR/PLS OXIMETRY 1: CPT

## 2023-08-04 PROCEDURE — 74420 UROGRAPHY RTRGR +-KUB: CPT

## 2023-08-04 PROCEDURE — C9399 UNCLASSIFIED DRUGS OR BIOLOG: HCPCS | Performed by: NURSE ANESTHETIST, CERTIFIED REGISTERED

## 2023-08-04 PROCEDURE — 2580000003 HC RX 258: Performed by: NURSE ANESTHETIST, CERTIFIED REGISTERED

## 2023-08-04 PROCEDURE — 2709999900 HC NON-CHARGEABLE SUPPLY: Performed by: UROLOGY

## 2023-08-04 PROCEDURE — 36415 COLL VENOUS BLD VENIPUNCTURE: CPT

## 2023-08-04 PROCEDURE — 2580000003 HC RX 258: Performed by: FAMILY MEDICINE

## 2023-08-04 PROCEDURE — 2580000003 HC RX 258: Performed by: UROLOGY

## 2023-08-04 PROCEDURE — 3700000000 HC ANESTHESIA ATTENDED CARE: Performed by: UROLOGY

## 2023-08-04 PROCEDURE — C1769 GUIDE WIRE: HCPCS | Performed by: UROLOGY

## 2023-08-04 PROCEDURE — 74176 CT ABD & PELVIS W/O CONTRAST: CPT

## 2023-08-04 DEVICE — URETERAL STENT WITH SIDE HOLES 6FX28CM
Type: IMPLANTABLE DEVICE | Site: URETER | Status: FUNCTIONAL
Brand: TRIA™ SOFT

## 2023-08-04 RX ORDER — OXYCODONE HYDROCHLORIDE 5 MG/1
10 TABLET ORAL EVERY 4 HOURS PRN
Status: DISCONTINUED | OUTPATIENT
Start: 2023-08-04 | End: 2023-08-05 | Stop reason: HOSPADM

## 2023-08-04 RX ORDER — SODIUM CHLORIDE, SODIUM LACTATE, POTASSIUM CHLORIDE, CALCIUM CHLORIDE 600; 310; 30; 20 MG/100ML; MG/100ML; MG/100ML; MG/100ML
INJECTION, SOLUTION INTRAVENOUS CONTINUOUS PRN
Status: DISCONTINUED | OUTPATIENT
Start: 2023-08-04 | End: 2023-08-04 | Stop reason: SDUPTHER

## 2023-08-04 RX ORDER — ONDANSETRON 2 MG/ML
4 INJECTION INTRAMUSCULAR; INTRAVENOUS EVERY 6 HOURS PRN
Status: DISCONTINUED | OUTPATIENT
Start: 2023-08-04 | End: 2023-08-05 | Stop reason: HOSPADM

## 2023-08-04 RX ORDER — CYCLOBENZAPRINE HCL 5 MG
10 TABLET ORAL 3 TIMES DAILY PRN
Status: DISCONTINUED | OUTPATIENT
Start: 2023-08-04 | End: 2023-08-05 | Stop reason: HOSPADM

## 2023-08-04 RX ORDER — ONDANSETRON 2 MG/ML
4 INJECTION INTRAMUSCULAR; INTRAVENOUS EVERY 6 HOURS PRN
Status: DISCONTINUED | OUTPATIENT
Start: 2023-08-04 | End: 2023-08-04

## 2023-08-04 RX ORDER — ACETAMINOPHEN 325 MG/1
650 TABLET ORAL EVERY 4 HOURS PRN
Status: DISCONTINUED | OUTPATIENT
Start: 2023-08-04 | End: 2023-08-05 | Stop reason: HOSPADM

## 2023-08-04 RX ORDER — PANTOPRAZOLE SODIUM 40 MG/1
40 TABLET, DELAYED RELEASE ORAL
Status: DISCONTINUED | OUTPATIENT
Start: 2023-08-04 | End: 2023-08-04

## 2023-08-04 RX ORDER — SODIUM CHLORIDE 0.9 % (FLUSH) 0.9 %
5-40 SYRINGE (ML) INJECTION PRN
Status: DISCONTINUED | OUTPATIENT
Start: 2023-08-04 | End: 2023-08-05 | Stop reason: HOSPADM

## 2023-08-04 RX ORDER — PHENAZOPYRIDINE HYDROCHLORIDE 100 MG/1
100 TABLET, FILM COATED ORAL EVERY 6 HOURS PRN
Status: DISCONTINUED | OUTPATIENT
Start: 2023-08-04 | End: 2023-08-05 | Stop reason: HOSPADM

## 2023-08-04 RX ORDER — IBUPROFEN 800 MG/1
800 TABLET ORAL EVERY 8 HOURS PRN
COMMUNITY

## 2023-08-04 RX ORDER — ROSUVASTATIN CALCIUM 10 MG/1
5 TABLET, COATED ORAL DAILY
Status: DISCONTINUED | OUTPATIENT
Start: 2023-08-05 | End: 2023-08-05 | Stop reason: HOSPADM

## 2023-08-04 RX ORDER — DIAZEPAM 5 MG/1
10 TABLET ORAL EVERY 8 HOURS PRN
Status: DISCONTINUED | OUTPATIENT
Start: 2023-08-04 | End: 2023-08-04

## 2023-08-04 RX ORDER — DIAZEPAM 10 MG/1
10 TABLET ORAL EVERY 8 HOURS PRN
COMMUNITY

## 2023-08-04 RX ORDER — DEXMETHYLPHENIDATE HYDROCHLORIDE 10 MG/1
10 TABLET ORAL 2 TIMES DAILY
COMMUNITY

## 2023-08-04 RX ORDER — DICYCLOMINE HYDROCHLORIDE 10 MG/1
10 CAPSULE ORAL
COMMUNITY

## 2023-08-04 RX ORDER — LABETALOL HYDROCHLORIDE 5 MG/ML
10 INJECTION, SOLUTION INTRAVENOUS
Status: DISCONTINUED | OUTPATIENT
Start: 2023-08-04 | End: 2023-08-04 | Stop reason: HOSPADM

## 2023-08-04 RX ORDER — SODIUM CHLORIDE 9 MG/ML
INJECTION, SOLUTION INTRAVENOUS CONTINUOUS
Status: DISCONTINUED | OUTPATIENT
Start: 2023-08-04 | End: 2023-08-05 | Stop reason: HOSPADM

## 2023-08-04 RX ORDER — OMEPRAZOLE 20 MG/1
20 CAPSULE, DELAYED RELEASE ORAL DAILY
COMMUNITY

## 2023-08-04 RX ORDER — ONDANSETRON 4 MG/1
4 TABLET, ORALLY DISINTEGRATING ORAL EVERY 8 HOURS PRN
Status: DISCONTINUED | OUTPATIENT
Start: 2023-08-04 | End: 2023-08-04

## 2023-08-04 RX ORDER — PANTOPRAZOLE SODIUM 40 MG/1
40 TABLET, DELAYED RELEASE ORAL
Status: DISCONTINUED | OUTPATIENT
Start: 2023-08-05 | End: 2023-08-05 | Stop reason: HOSPADM

## 2023-08-04 RX ORDER — PREGABALIN 150 MG/1
150 CAPSULE ORAL 2 TIMES DAILY
Status: DISCONTINUED | OUTPATIENT
Start: 2023-08-04 | End: 2023-08-05 | Stop reason: HOSPADM

## 2023-08-04 RX ORDER — ONDANSETRON 2 MG/ML
4 INJECTION INTRAMUSCULAR; INTRAVENOUS ONCE
Status: COMPLETED | OUTPATIENT
Start: 2023-08-04 | End: 2023-08-04

## 2023-08-04 RX ORDER — SODIUM CHLORIDE 9 MG/ML
INJECTION, SOLUTION INTRAVENOUS PRN
Status: DISCONTINUED | OUTPATIENT
Start: 2023-08-04 | End: 2023-08-04 | Stop reason: HOSPADM

## 2023-08-04 RX ORDER — HYDROMORPHONE HYDROCHLORIDE 1 MG/ML
0.5 INJECTION, SOLUTION INTRAMUSCULAR; INTRAVENOUS; SUBCUTANEOUS EVERY 5 MIN PRN
Status: DISCONTINUED | OUTPATIENT
Start: 2023-08-04 | End: 2023-08-04 | Stop reason: HOSPADM

## 2023-08-04 RX ORDER — DIAZEPAM 5 MG/1
10 TABLET ORAL EVERY 8 HOURS PRN
Status: DISCONTINUED | OUTPATIENT
Start: 2023-08-04 | End: 2023-08-05 | Stop reason: HOSPADM

## 2023-08-04 RX ORDER — DIPHENHYDRAMINE HYDROCHLORIDE 50 MG/ML
12.5 INJECTION INTRAMUSCULAR; INTRAVENOUS
Status: DISCONTINUED | OUTPATIENT
Start: 2023-08-04 | End: 2023-08-04 | Stop reason: HOSPADM

## 2023-08-04 RX ORDER — MIDAZOLAM HYDROCHLORIDE 1 MG/ML
INJECTION INTRAMUSCULAR; INTRAVENOUS PRN
Status: DISCONTINUED | OUTPATIENT
Start: 2023-08-04 | End: 2023-08-04 | Stop reason: SDUPTHER

## 2023-08-04 RX ORDER — PREGABALIN 150 MG/1
150 CAPSULE ORAL 2 TIMES DAILY
Status: DISCONTINUED | OUTPATIENT
Start: 2023-08-04 | End: 2023-08-04

## 2023-08-04 RX ORDER — OXYCODONE HYDROCHLORIDE 5 MG/1
5 TABLET ORAL EVERY 4 HOURS PRN
Status: DISCONTINUED | OUTPATIENT
Start: 2023-08-04 | End: 2023-08-05 | Stop reason: HOSPADM

## 2023-08-04 RX ORDER — MEPERIDINE HYDROCHLORIDE 25 MG/ML
12.5 INJECTION INTRAMUSCULAR; INTRAVENOUS; SUBCUTANEOUS
Status: DISCONTINUED | OUTPATIENT
Start: 2023-08-04 | End: 2023-08-04 | Stop reason: HOSPADM

## 2023-08-04 RX ORDER — ONDANSETRON 2 MG/ML
INJECTION INTRAMUSCULAR; INTRAVENOUS PRN
Status: DISCONTINUED | OUTPATIENT
Start: 2023-08-04 | End: 2023-08-04 | Stop reason: SDUPTHER

## 2023-08-04 RX ORDER — DICYCLOMINE HYDROCHLORIDE 10 MG/1
10 CAPSULE ORAL
Status: DISCONTINUED | OUTPATIENT
Start: 2023-08-04 | End: 2023-08-04

## 2023-08-04 RX ORDER — ACETAMINOPHEN 325 MG/1
650 TABLET ORAL EVERY 4 HOURS PRN
Status: DISCONTINUED | OUTPATIENT
Start: 2023-08-04 | End: 2023-08-04

## 2023-08-04 RX ORDER — 0.9 % SODIUM CHLORIDE 0.9 %
1000 INTRAVENOUS SOLUTION INTRAVENOUS ONCE
Status: DISCONTINUED | OUTPATIENT
Start: 2023-08-04 | End: 2023-08-05 | Stop reason: HOSPADM

## 2023-08-04 RX ORDER — PROCHLORPERAZINE EDISYLATE 5 MG/ML
5 INJECTION INTRAMUSCULAR; INTRAVENOUS
Status: DISCONTINUED | OUTPATIENT
Start: 2023-08-04 | End: 2023-08-04 | Stop reason: HOSPADM

## 2023-08-04 RX ORDER — FENTANYL CITRATE 50 UG/ML
INJECTION, SOLUTION INTRAMUSCULAR; INTRAVENOUS PRN
Status: DISCONTINUED | OUTPATIENT
Start: 2023-08-04 | End: 2023-08-04 | Stop reason: SDUPTHER

## 2023-08-04 RX ORDER — CYCLOBENZAPRINE HCL 10 MG
10 TABLET ORAL 3 TIMES DAILY PRN
COMMUNITY

## 2023-08-04 RX ORDER — TAMSULOSIN HYDROCHLORIDE 0.4 MG/1
0.4 CAPSULE ORAL DAILY
Status: DISCONTINUED | OUTPATIENT
Start: 2023-08-04 | End: 2023-08-05 | Stop reason: HOSPADM

## 2023-08-04 RX ORDER — SODIUM CHLORIDE 0.9 % (FLUSH) 0.9 %
5-40 SYRINGE (ML) INJECTION EVERY 12 HOURS SCHEDULED
Status: DISCONTINUED | OUTPATIENT
Start: 2023-08-04 | End: 2023-08-05 | Stop reason: HOSPADM

## 2023-08-04 RX ORDER — 0.9 % SODIUM CHLORIDE 0.9 %
1000 INTRAVENOUS SOLUTION INTRAVENOUS ONCE
Status: DISCONTINUED | OUTPATIENT
Start: 2023-08-04 | End: 2023-08-04 | Stop reason: SDUPTHER

## 2023-08-04 RX ORDER — ENOXAPARIN SODIUM 100 MG/ML
40 INJECTION SUBCUTANEOUS DAILY
Status: DISCONTINUED | OUTPATIENT
Start: 2023-08-04 | End: 2023-08-04 | Stop reason: SDUPTHER

## 2023-08-04 RX ORDER — SODIUM CHLORIDE 9 MG/ML
INJECTION, SOLUTION INTRAVENOUS PRN
Status: DISCONTINUED | OUTPATIENT
Start: 2023-08-04 | End: 2023-08-05 | Stop reason: HOSPADM

## 2023-08-04 RX ORDER — HYDROMORPHONE HYDROCHLORIDE 1 MG/ML
0.25 INJECTION, SOLUTION INTRAMUSCULAR; INTRAVENOUS; SUBCUTANEOUS EVERY 5 MIN PRN
Status: DISCONTINUED | OUTPATIENT
Start: 2023-08-04 | End: 2023-08-04 | Stop reason: HOSPADM

## 2023-08-04 RX ORDER — SODIUM CHLORIDE, SODIUM LACTATE, POTASSIUM CHLORIDE, CALCIUM CHLORIDE 600; 310; 30; 20 MG/100ML; MG/100ML; MG/100ML; MG/100ML
INJECTION, SOLUTION INTRAVENOUS CONTINUOUS
Status: DISCONTINUED | OUTPATIENT
Start: 2023-08-04 | End: 2023-08-04 | Stop reason: HOSPADM

## 2023-08-04 RX ORDER — SODIUM CHLORIDE 0.9 % (FLUSH) 0.9 %
5-40 SYRINGE (ML) INJECTION EVERY 12 HOURS SCHEDULED
Status: DISCONTINUED | OUTPATIENT
Start: 2023-08-04 | End: 2023-08-04 | Stop reason: HOSPADM

## 2023-08-04 RX ORDER — CYCLOBENZAPRINE HCL 5 MG
10 TABLET ORAL 3 TIMES DAILY PRN
Status: DISCONTINUED | OUTPATIENT
Start: 2023-08-04 | End: 2023-08-04

## 2023-08-04 RX ORDER — SODIUM CHLORIDE 9 MG/ML
INJECTION, SOLUTION INTRAVENOUS CONTINUOUS
Status: DISCONTINUED | OUTPATIENT
Start: 2023-08-04 | End: 2023-08-04

## 2023-08-04 RX ORDER — PREGABALIN 150 MG/1
150 CAPSULE ORAL 2 TIMES DAILY
COMMUNITY

## 2023-08-04 RX ORDER — PROPOFOL 10 MG/ML
INJECTION, EMULSION INTRAVENOUS PRN
Status: DISCONTINUED | OUTPATIENT
Start: 2023-08-04 | End: 2023-08-04 | Stop reason: SDUPTHER

## 2023-08-04 RX ORDER — SODIUM CHLORIDE 0.9 % (FLUSH) 0.9 %
5-40 SYRINGE (ML) INJECTION PRN
Status: DISCONTINUED | OUTPATIENT
Start: 2023-08-04 | End: 2023-08-04 | Stop reason: HOSPADM

## 2023-08-04 RX ORDER — ROSUVASTATIN CALCIUM 5 MG/1
5 TABLET, COATED ORAL DAILY
COMMUNITY

## 2023-08-04 RX ORDER — HYDRALAZINE HYDROCHLORIDE 20 MG/ML
10 INJECTION INTRAMUSCULAR; INTRAVENOUS
Status: DISCONTINUED | OUTPATIENT
Start: 2023-08-04 | End: 2023-08-04 | Stop reason: HOSPADM

## 2023-08-04 RX ORDER — LIDOCAINE HYDROCHLORIDE 10 MG/ML
INJECTION, SOLUTION EPIDURAL; INFILTRATION; INTRACAUDAL; PERINEURAL PRN
Status: DISCONTINUED | OUTPATIENT
Start: 2023-08-04 | End: 2023-08-04 | Stop reason: SDUPTHER

## 2023-08-04 RX ORDER — HYDROMORPHONE HYDROCHLORIDE 1 MG/ML
0.5 INJECTION, SOLUTION INTRAMUSCULAR; INTRAVENOUS; SUBCUTANEOUS EVERY 30 MIN PRN
Status: COMPLETED | OUTPATIENT
Start: 2023-08-04 | End: 2023-08-04

## 2023-08-04 RX ORDER — KETOROLAC TROMETHAMINE 30 MG/ML
INJECTION, SOLUTION INTRAMUSCULAR; INTRAVENOUS PRN
Status: DISCONTINUED | OUTPATIENT
Start: 2023-08-04 | End: 2023-08-04 | Stop reason: SDUPTHER

## 2023-08-04 RX ORDER — DEXAMETHASONE SODIUM PHOSPHATE 10 MG/ML
INJECTION, SOLUTION INTRAMUSCULAR; INTRAVENOUS PRN
Status: DISCONTINUED | OUTPATIENT
Start: 2023-08-04 | End: 2023-08-04 | Stop reason: SDUPTHER

## 2023-08-04 RX ORDER — SILDENAFIL 100 MG/1
100 TABLET, FILM COATED ORAL PRN
COMMUNITY

## 2023-08-04 RX ORDER — ENOXAPARIN SODIUM 100 MG/ML
40 INJECTION SUBCUTANEOUS NIGHTLY
Status: DISCONTINUED | OUTPATIENT
Start: 2023-08-04 | End: 2023-08-05 | Stop reason: HOSPADM

## 2023-08-04 RX ORDER — DICYCLOMINE HYDROCHLORIDE 10 MG/1
10 CAPSULE ORAL
Status: DISCONTINUED | OUTPATIENT
Start: 2023-08-04 | End: 2023-08-05 | Stop reason: HOSPADM

## 2023-08-04 RX ORDER — ROCURONIUM BROMIDE 10 MG/ML
INJECTION, SOLUTION INTRAVENOUS PRN
Status: DISCONTINUED | OUTPATIENT
Start: 2023-08-04 | End: 2023-08-04 | Stop reason: SDUPTHER

## 2023-08-04 RX ORDER — ROSUVASTATIN CALCIUM 10 MG/1
5 TABLET, COATED ORAL DAILY
Status: DISCONTINUED | OUTPATIENT
Start: 2023-08-04 | End: 2023-08-04

## 2023-08-04 RX ORDER — ONDANSETRON 4 MG/1
4 TABLET, ORALLY DISINTEGRATING ORAL EVERY 8 HOURS PRN
Status: DISCONTINUED | OUTPATIENT
Start: 2023-08-04 | End: 2023-08-05 | Stop reason: HOSPADM

## 2023-08-04 RX ADMIN — LIDOCAINE HYDROCHLORIDE 50 MG: 10 INJECTION, SOLUTION EPIDURAL; INFILTRATION; INTRACAUDAL; PERINEURAL at 15:36

## 2023-08-04 RX ADMIN — SODIUM CHLORIDE, POTASSIUM CHLORIDE, SODIUM LACTATE AND CALCIUM CHLORIDE: 600; 310; 30; 20 INJECTION, SOLUTION INTRAVENOUS at 15:17

## 2023-08-04 RX ADMIN — DEXAMETHASONE SODIUM PHOSPHATE 10 MG: 10 INJECTION, SOLUTION INTRAMUSCULAR; INTRAVENOUS at 15:36

## 2023-08-04 RX ADMIN — SODIUM CHLORIDE 1000 ML: 9 INJECTION, SOLUTION INTRAVENOUS at 06:04

## 2023-08-04 RX ADMIN — DICYCLOMINE HYDROCHLORIDE 10 MG: 10 CAPSULE ORAL at 19:56

## 2023-08-04 RX ADMIN — DICYCLOMINE HYDROCHLORIDE 10 MG: 10 CAPSULE ORAL at 12:02

## 2023-08-04 RX ADMIN — CYCLOBENZAPRINE HYDROCHLORIDE 10 MG: 5 TABLET, FILM COATED ORAL at 18:21

## 2023-08-04 RX ADMIN — DIAZEPAM 10 MG: 5 TABLET ORAL at 18:21

## 2023-08-04 RX ADMIN — SUGAMMADEX 200 MG: 100 INJECTION, SOLUTION INTRAVENOUS at 16:00

## 2023-08-04 RX ADMIN — OXYCODONE HYDROCHLORIDE 5 MG: 5 TABLET ORAL at 18:19

## 2023-08-04 RX ADMIN — FENTANYL CITRATE 100 MCG: 0.05 INJECTION, SOLUTION INTRAMUSCULAR; INTRAVENOUS at 15:36

## 2023-08-04 RX ADMIN — KETOROLAC TROMETHAMINE 30 MG: 30 INJECTION, SOLUTION INTRAMUSCULAR; INTRAVENOUS at 15:36

## 2023-08-04 RX ADMIN — PREGABALIN 150 MG: 150 CAPSULE ORAL at 19:55

## 2023-08-04 RX ADMIN — HYDROMORPHONE HYDROCHLORIDE 0.5 MG: 1 INJECTION, SOLUTION INTRAMUSCULAR; INTRAVENOUS; SUBCUTANEOUS at 09:03

## 2023-08-04 RX ADMIN — ONDANSETRON 4 MG: 2 INJECTION INTRAMUSCULAR; INTRAVENOUS at 15:36

## 2023-08-04 RX ADMIN — ONDANSETRON 4 MG: 2 INJECTION INTRAMUSCULAR; INTRAVENOUS at 06:07

## 2023-08-04 RX ADMIN — MIDAZOLAM 2 MG: 1 INJECTION INTRAMUSCULAR; INTRAVENOUS at 15:29

## 2023-08-04 RX ADMIN — SODIUM CHLORIDE, SODIUM LACTATE, POTASSIUM CHLORIDE, AND CALCIUM CHLORIDE: 600; 310; 30; 20 INJECTION, SOLUTION INTRAVENOUS at 15:29

## 2023-08-04 RX ADMIN — HYDROMORPHONE HYDROCHLORIDE 0.5 MG: 1 INJECTION, SOLUTION INTRAMUSCULAR; INTRAVENOUS; SUBCUTANEOUS at 12:02

## 2023-08-04 RX ADMIN — TAMSULOSIN HYDROCHLORIDE 0.4 MG: 0.4 CAPSULE ORAL at 17:38

## 2023-08-04 RX ADMIN — SODIUM CHLORIDE, PRESERVATIVE FREE 10 ML: 5 INJECTION INTRAVENOUS at 19:55

## 2023-08-04 RX ADMIN — HYDROMORPHONE HYDROCHLORIDE 0.5 MG: 1 INJECTION, SOLUTION INTRAMUSCULAR; INTRAVENOUS; SUBCUTANEOUS at 06:07

## 2023-08-04 RX ADMIN — ROCURONIUM BROMIDE 50 MG: 10 INJECTION, SOLUTION INTRAVENOUS at 15:36

## 2023-08-04 RX ADMIN — CEFAZOLIN 2 G: 10 INJECTION, POWDER, FOR SOLUTION INTRAVENOUS at 15:45

## 2023-08-04 RX ADMIN — PROPOFOL 200 MG: 10 INJECTION, EMULSION INTRAVENOUS at 15:36

## 2023-08-04 RX ADMIN — SODIUM CHLORIDE: 9 INJECTION, SOLUTION INTRAVENOUS at 09:02

## 2023-08-04 RX ADMIN — SODIUM CHLORIDE: 9 INJECTION, SOLUTION INTRAVENOUS at 17:39

## 2023-08-04 ASSESSMENT — PAIN SCALES - GENERAL
PAINLEVEL_OUTOF10: 7
PAINLEVEL_OUTOF10: 6
PAINLEVEL_OUTOF10: 6
PAINLEVEL_OUTOF10: 4
PAINLEVEL_OUTOF10: 0
PAINLEVEL_OUTOF10: 4

## 2023-08-04 ASSESSMENT — PAIN DESCRIPTION - LOCATION
LOCATION: BACK
LOCATION: BACK
LOCATION: FLANK

## 2023-08-04 ASSESSMENT — ENCOUNTER SYMPTOMS
EYE DISCHARGE: 0
VOMITING: 1
NAUSEA: 1
BACK PAIN: 0
DIARRHEA: 0
COLOR CHANGE: 0
ABDOMINAL PAIN: 0
SHORTNESS OF BREATH: 0
RHINORRHEA: 0
COUGH: 0

## 2023-08-04 ASSESSMENT — PAIN - FUNCTIONAL ASSESSMENT
PAIN_FUNCTIONAL_ASSESSMENT: ACTIVITIES ARE NOT PREVENTED
PAIN_FUNCTIONAL_ASSESSMENT: 0-10

## 2023-08-04 ASSESSMENT — PAIN DESCRIPTION - DESCRIPTORS
DESCRIPTORS: DISCOMFORT;CRAMPING
DESCRIPTORS: ACHING
DESCRIPTORS: ACHING

## 2023-08-04 ASSESSMENT — LIFESTYLE VARIABLES: SMOKING_STATUS: 1

## 2023-08-04 ASSESSMENT — PAIN DESCRIPTION - ORIENTATION
ORIENTATION: RIGHT
ORIENTATION: RIGHT

## 2023-08-04 NOTE — ED NOTES
Called Dr. Evan Severino office for Dr. Sandra De La Cruz. Left message with answering service.       Gloria Daugherty  08/04/23 0715

## 2023-08-04 NOTE — H&P
I have examined the patient today. No change in exam or history. I discussed in length the risks and benefits and alternatives  of the procedure with the patient . He accepts risks and agrees to proceed. Here for cystoscopy and right ureteral stenting for obstructing right proximal ureteral stone. Risks and complications explained. . Please refer to my consultation note from this afternoon. Antwan Franks

## 2023-08-04 NOTE — PROGRESS NOTES
4 Eyes Skin Assessment    Reji Corona is being assessed upon: Admission    I agree that Kennedy Tierney, RN, along with Taz Cruz RN (either 2 RN's or 1 LPN and 1 RN) have performed a thorough Head to Toe Skin Assessment on the patient. ALL assessment sites listed below have been assessed. Areas assessed by both nurses:     [x]   Head, Face, and Ears   [x]   Shoulders, Back, and Chest  [x]   Arms, Elbows, and Hands   [x]   Coccyx, Sacrum, and Ischium  [x]   Legs, Feet, and Heels    Does the Patient have Skin Breakdown?  No    Papi Prevention initiated: NA  Wound Care Orders initiated: NA    [facility] nurse consulted for Pressure Injury (Stage 3,4, Unstageable, DTI, NWPT, and Complex wounds) and New or Established Ostomies: NA        Primary Nurse eSignature: Haley Carrillo RN on 8/4/2023 at 6:10 PM      Co-Signer eSignature: {Esignature:[id number]}

## 2023-08-04 NOTE — PLAN OF CARE
Problem: Discharge Planning  Goal: Discharge to home or other facility with appropriate resources  Outcome: Progressing  Flowsheets (Taken 8/4/2023 0751)  Discharge to home or other facility with appropriate resources:   Identify barriers to discharge with patient and caregiver   Identify discharge learning needs (meds, wound care, etc)   Refer to discharge planning if patient needs post-hospital services based on physician order or complex needs related to functional status, cognitive ability or social support system   Arrange for needed discharge resources and transportation as appropriate     Problem: Pain  Goal: Verbalizes/displays adequate comfort level or baseline comfort level  Outcome: Progressing     Problem: Safety - Adult  Goal: Free from fall injury  Outcome: Progressing

## 2023-08-04 NOTE — ED PROVIDER NOTES
[facility] EMERGENCY DEPT  eMERGENCY dEPARTMENT eNCOUnter      Pt Name: Dixie Huddleston  MRN: [id number]  [address] [facility] [place] 1970  Date of evaluation: 8/4/2023  Provider: Ronit Good MD    [address]       Chief Complaint   Patient presents with    Flank Pain     Patient reports right flank pain and right suprapubic pain. Patient says he passed a kidney stone last month. HISTORY OF PRESENT ILLNESS   (Location/Symptom, Timing/Onset,Context/Setting, Quality, Duration, Modifying Factors, Severity)  Note limiting factors. Dixie Huddleston is a 48 y.o. male who presents to the emergency department with flank pain with right flank pain. Patient states that flank pain began 1 week ago. Patient states that pain is located in his right back and radiates to his suprapubic area. Patient's urine has been dark but he does not recognize any blood in it. Pain is sharp \"like an ice pick in my back. \"  Severity is 6 out of 10. Patient denies exacerbating or alleviating factors. Patient has had nausea and vomiting. Patient denies fever, diarrhea, penile or scrotal pain, burning with urination or difficulty urinating. Patient has history of kidney stones. HPI    NursingNotes were reviewed. REVIEW OF SYSTEMS    (2-9 systems for level 4, 10 or more for level 5)     Review of Systems   Constitutional:  Negative for chills and fever. HENT:  Negative for congestion and rhinorrhea. Eyes:  Negative for discharge. Respiratory:  Negative for cough and shortness of breath. Cardiovascular:  Negative for chest pain and palpitations. Gastrointestinal:  Positive for nausea and vomiting. Negative for abdominal pain and diarrhea. Genitourinary:  Positive for flank pain (Right). Negative for difficulty urinating, dysuria and hematuria. Musculoskeletal:  Negative for back pain and neck pain. Skin:  Negative for color change and pallor. Neurological:  Negative for syncope and light-headedness.    Psychiatric/Behavioral:

## 2023-08-04 NOTE — H&P
CHIEF COMPLAINT: Right flank pain    History Obtained From:  patient, spouse     HISTORY OF PRESENT ILLNESS:      The patient is a 48 y.o. 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 Woke him up early this morning with increased pain. Had some mild nausea with one episode of emesis. Has felt pain like this before with prior kidney stones. Has had multiple lithotripsies and prior basket retrieval.  Attempted interventions at home including increasing fluid intake and massage without relief. Work-up in the emergency department was found to have an obstructing stone on the right with multiple stones bilaterally. Past Medical History:   Diagnosis Date    Hypertension     Kidney stone        Past Surgical History:   Procedure Laterality Date    BACK SURGERY      CERVICAL SPINE SURGERY      LITHOTRIPSY      SPINAL FUSION      TONSILLECTOMY         Medications Prior to Admission:    Medications Prior to Admission: diazePAM (VALIUM) 10 MG tablet, Take 1 tablet by mouth every 8 hours as needed for Anxiety. Max Daily Amount: 30 mg  omeprazole (PRILOSEC) 20 MG delayed release capsule, Take 1 capsule by mouth daily  sildenafil (VIAGRA) 100 MG tablet, Take 1 tablet by mouth as needed for Erectile Dysfunction  cyclobenzaprine (FLEXERIL) 10 MG tablet, Take 1 tablet by mouth 3 times daily as needed for Muscle spasms  dicyclomine (BENTYL) 10 MG capsule, Take 1 capsule by mouth 4 times daily (before meals and nightly)  rosuvastatin (CRESTOR) 5 MG tablet, Take 1 tablet by mouth daily  dexmethylphenidate (FOCALIN) 10 MG tablet, Take 1 tablet by mouth 2 times daily. Max Daily Amount: 20 mg  pregabalin (LYRICA) 150 MG capsule, Take 1 capsule by mouth 2 times daily.  Max Daily Amount: 300 mg  Lansoprazole (PREVACID PO), Take by mouth  ibuprofen

## 2023-08-04 NOTE — OP NOTE
Operative Note      Patient: Yolanda Zamora  YOB: 1970  MRN: [id number]    Date of Procedure: 8/4/2023    Pre-Op Diagnosis Codes: * Hydronephrosis with renal and ureteral calculus obstruction [N13.2] right side    Post-Op Diagnosis: Same       Procedure(s):  RETROGRADE CYSTOSCOPY RIGHT URETERAL STENT PLACEMENT    Surgeon(s):  Rayo Darling MD    Assistant:   * No surgical staff found *    Anesthesia: General    Estimated Blood Loss (mL): Minimal    Complications: None    Specimens:   ID Type Source Tests Collected by Time Destination   1 : urine Urine Bladder CULTURE, URINE Rayo Darling MD 8/4/2023 1550        Implants:  Implant Name Type Inv. Item Serial No.  Lot No. LRB No. Used Action   STENT URETERAL 6 FRX28 CM SOFT MONOFILAMENT TRIA - [id number]  STENT URETERAL 6 FRX28 CM SOFT MONOFILAMENT TRIA  [facility] UROLOGY-WD [id number] Right 1 Implanted         Drains:   [REMOVED] Urinary Catheter 12/11/16 Non-latex (Removed)       Findings:     Radiopaque stones in the right proximal ureter, post 6 to 7 mm. Also renal stones seen. Retrograde urography showed mild hydronephrosis about the stone. 6 [place] by 28 cm ureteral stent placed      Detailed Description of Procedure:   Patient was seen in the preoperative holding area, brought into the operating room. After induction of general anesthesia he had 2 g of Ancef IV. Initial compression stockings for DVT prophylaxis. He was placed in the cystolithotomy position. Extremities joints were carefully positioned and padded. His genitals Elsa Rossi was sterilely prepped and draped. It was performed. Initial cystoscopy with 21 [place] cystoscope showed normal anterior urethra, small occlusive lateral lobes of the prostate,  bladder was normal including the ureteral orifices. Cannulated the right ureteral orifice with a 5 [place] ureteral catheter and a sensor wire was advanced.   The 5 [place] ureteral catheter was advanced over

## 2023-08-05 VITALS
TEMPERATURE: 98.1 F | SYSTOLIC BLOOD PRESSURE: 134 MMHG | DIASTOLIC BLOOD PRESSURE: 81 MMHG | RESPIRATION RATE: 18 BRPM | WEIGHT: 195 LBS | HEIGHT: 70 IN | BODY MASS INDEX: 27.92 KG/M2 | OXYGEN SATURATION: 95 % | HEART RATE: 51 BPM

## 2023-08-05 LAB
ALBUMIN SERPL-MCNC: 3.8 G/DL (ref 3.5–5.2)
ALP SERPL-CCNC: 76 U/L (ref 40–130)
ALT SERPL-CCNC: 15 U/L (ref 5–41)
ANION GAP SERPL CALCULATED.3IONS-SCNC: 13 MMOL/L (ref 7–19)
AST SERPL-CCNC: 21 U/L (ref 5–40)
BASOPHILS # BLD: 0 K/UL (ref 0–0.2)
BASOPHILS NFR BLD: 0 % (ref 0–1)
BILIRUB SERPL-MCNC: <0.2 MG/DL (ref 0.2–1.2)
BUN SERPL-MCNC: 17 MG/DL (ref 6–20)
CALCIUM SERPL-MCNC: 9 MG/DL (ref 8.6–10)
CHLORIDE SERPL-SCNC: 103 MMOL/L (ref 98–111)
CO2 SERPL-SCNC: 25 MMOL/L (ref 22–29)
CREAT SERPL-MCNC: 1.3 MG/DL (ref 0.5–1.2)
EOSINOPHIL # BLD: 0 K/UL (ref 0–0.6)
EOSINOPHIL NFR BLD: 0 % (ref 0–5)
ERYTHROCYTE [DISTWIDTH] IN BLOOD BY AUTOMATED COUNT: 13.9 % (ref 11.5–14.5)
GLUCOSE SERPL-MCNC: 175 MG/DL (ref 74–109)
HCT VFR BLD AUTO: 42.8 % (ref 42–52)
HGB BLD-MCNC: 13.6 G/DL (ref 14–18)
IMM GRANULOCYTES # BLD: 0 K/UL
LYMPHOCYTES # BLD: 0.6 K/UL (ref 1.1–4.5)
LYMPHOCYTES NFR BLD: 10 % (ref 20–40)
MCH RBC QN AUTO: 29.6 PG (ref 27–31)
MCHC RBC AUTO-ENTMCNC: 31.8 G/DL (ref 33–37)
MCV RBC AUTO: 93.2 FL (ref 80–94)
MONOCYTES # BLD: 0.1 K/UL (ref 0–0.9)
MONOCYTES NFR BLD: 2 % (ref 0–10)
NEUTROPHILS # BLD: 4.9 K/UL (ref 1.5–7.5)
NEUTS SEG NFR BLD: 87.5 % (ref 50–65)
PLATELET # BLD AUTO: 172 K/UL (ref 130–400)
PMV BLD AUTO: 12.9 FL (ref 9.4–12.4)
POTASSIUM SERPL-SCNC: 4 MMOL/L (ref 3.5–5)
PROT SERPL-MCNC: 6.7 G/DL (ref 6.6–8.7)
RBC # BLD AUTO: 4.59 M/UL (ref 4.7–6.1)
SODIUM SERPL-SCNC: 141 MMOL/L (ref 136–145)
WBC # BLD AUTO: 5.6 K/UL (ref 4.8–10.8)

## 2023-08-05 PROCEDURE — 6370000000 HC RX 637 (ALT 250 FOR IP): Performed by: FAMILY MEDICINE

## 2023-08-05 PROCEDURE — 96361 HYDRATE IV INFUSION ADD-ON: CPT

## 2023-08-05 PROCEDURE — G0378 HOSPITAL OBSERVATION PER HR: HCPCS

## 2023-08-05 PROCEDURE — 80053 COMPREHEN METABOLIC PANEL: CPT

## 2023-08-05 PROCEDURE — 85025 COMPLETE CBC W/AUTO DIFF WBC: CPT

## 2023-08-05 PROCEDURE — 6370000000 HC RX 637 (ALT 250 FOR IP): Performed by: UROLOGY

## 2023-08-05 PROCEDURE — 36415 COLL VENOUS BLD VENIPUNCTURE: CPT

## 2023-08-05 RX ORDER — PHENAZOPYRIDINE HYDROCHLORIDE 100 MG/1
100 TABLET, FILM COATED ORAL EVERY 6 HOURS PRN
Qty: 12 TABLET | Refills: 0 | Status: SHIPPED | OUTPATIENT
Start: 2023-08-05 | End: 2023-08-05 | Stop reason: SDUPTHER

## 2023-08-05 RX ORDER — PHENAZOPYRIDINE HYDROCHLORIDE 100 MG/1
100 TABLET, FILM COATED ORAL EVERY 6 HOURS PRN
Qty: 12 TABLET | Refills: 0 | Status: SHIPPED | OUTPATIENT
Start: 2023-08-05 | End: 2023-08-08

## 2023-08-05 RX ORDER — TAMSULOSIN HYDROCHLORIDE 0.4 MG/1
0.4 CAPSULE ORAL DAILY
Qty: 30 CAPSULE | Refills: 3 | Status: SHIPPED | OUTPATIENT
Start: 2023-08-05 | End: 2023-08-05 | Stop reason: SDUPTHER

## 2023-08-05 RX ORDER — TAMSULOSIN HYDROCHLORIDE 0.4 MG/1
0.4 CAPSULE ORAL DAILY
Qty: 30 CAPSULE | Refills: 3 | Status: SHIPPED | OUTPATIENT
Start: 2023-08-05

## 2023-08-05 RX ORDER — OXYCODONE HYDROCHLORIDE 5 MG/1
5 TABLET ORAL EVERY 4 HOURS PRN
Qty: 18 TABLET | Refills: 0 | Status: SHIPPED | OUTPATIENT
Start: 2023-08-05 | End: 2023-08-08

## 2023-08-05 RX ADMIN — TAMSULOSIN HYDROCHLORIDE 0.4 MG: 0.4 CAPSULE ORAL at 08:09

## 2023-08-05 RX ADMIN — PANTOPRAZOLE SODIUM 40 MG: 40 TABLET, DELAYED RELEASE ORAL at 05:31

## 2023-08-05 RX ADMIN — DICYCLOMINE HYDROCHLORIDE 10 MG: 10 CAPSULE ORAL at 05:31

## 2023-08-05 RX ADMIN — OXYCODONE HYDROCHLORIDE 10 MG: 5 TABLET ORAL at 00:13

## 2023-08-05 RX ADMIN — OXYCODONE HYDROCHLORIDE 10 MG: 5 TABLET ORAL at 05:34

## 2023-08-05 RX ADMIN — PHENAZOPYRIDINE 100 MG: 100 TABLET ORAL at 00:20

## 2023-08-05 RX ADMIN — ROSUVASTATIN 5 MG: 10 TABLET, FILM COATED ORAL at 08:09

## 2023-08-05 RX ADMIN — PREGABALIN 150 MG: 150 CAPSULE ORAL at 08:09

## 2023-08-05 ASSESSMENT — PAIN DESCRIPTION - ORIENTATION
ORIENTATION: RIGHT
ORIENTATION: RIGHT

## 2023-08-05 ASSESSMENT — PAIN DESCRIPTION - DESCRIPTORS: DESCRIPTORS: ACHING;DISCOMFORT

## 2023-08-05 ASSESSMENT — PAIN DESCRIPTION - LOCATION
LOCATION: FLANK;BACK;NECK
LOCATION: FLANK;OTHER (COMMENT)

## 2023-08-05 ASSESSMENT — PAIN SCALES - GENERAL
PAINLEVEL_OUTOF10: 8
PAINLEVEL_OUTOF10: 0

## 2023-08-05 NOTE — PLAN OF CARE
Problem: Discharge Planning  Goal: Discharge to home or other facility with appropriate resources  8/4/2023 2253 by Stu Beltran RN  Outcome: Progressing  8/4/2023 1848 by Sheridan Haro RN  Outcome: Progressing  Flowsheets (Taken 8/4/2023 1069)  Discharge to home or other facility with appropriate resources:   Identify barriers to discharge with patient and caregiver   Identify discharge learning needs (meds, wound care, etc)   Refer to discharge planning if patient needs post-hospital services based on physician order or complex needs related to functional status, cognitive ability or social support system   Arrange for needed discharge resources and transportation as appropriate     Problem: Pain  Goal: Verbalizes/displays adequate comfort level or baseline comfort level  8/4/2023 2253 by Stu Beltran RN  Outcome: Progressing  8/4/2023 1848 by Sheridan Haro RN  Outcome: Progressing     Problem: ABCDS Injury Assessment  Goal: Absence of physical injury  Outcome: Progressing     Problem: Safety - Adult  Goal: Free from fall injury  8/4/2023 2253 by Stu Beltran RN  Outcome: Progressing  8/4/2023 1848 by Sheridan Haro RN  Outcome: Progressing   Electronically signed by Stu Beltran RN on 8/4/2023 at 10:54 PM

## 2023-08-05 NOTE — DISCHARGE SUMMARY
08/05/2023 01:31 AM    MCH 29.6 08/05/2023 01:31 AM    MCHC 31.8 08/05/2023 01:31 AM    RDW 13.9 08/05/2023 01:31 AM     08/05/2023 01:31 AM     CMP:    Lab Results   Component Value Date/Time    GLUCOSE 175 08/05/2023 01:31 AM     08/05/2023 01:31 AM    K 4.0 08/05/2023 01:31 AM     08/05/2023 01:31 AM    CO2 25 08/05/2023 01:31 AM    BUN 17 08/05/2023 01:31 AM    CREATININE 1.3 08/05/2023 01:31 AM    ANIONGAP 13 08/05/2023 01:31 AM    ALKPHOS 76 08/05/2023 01:31 AM    ALT 15 08/05/2023 01:31 AM    AST 21 08/05/2023 01:31 AM    BILITOT <0.2 08/05/2023 01:31 AM    LABALBU 3.8 08/05/2023 01:31 AM    LABGLOM >60 08/05/2023 01:31 AM    PROT 6.7 08/05/2023 01:31 AM    CALCIUM 9.0 08/05/2023 01:31 AM     U/A:    Lab Results   Component Value Date/Time    COLORU YELLOW 08/04/2023 05:24 AM    SPECGRAV 1.019 08/04/2023 05:24 AM    PHUR 6.0 08/04/2023 05:24 AM    PROTEINU Negative 08/04/2023 05:24 AM    GLUCOSEU Negative 08/04/2023 05:24 AM    KETUA TRACE 08/04/2023 05:24 AM    BILIRUBINUR Negative 08/04/2023 05:24 AM    UROBILINOGEN 1.0 08/04/2023 05:24 AM    NITRU Negative 08/04/2023 05:24 AM    LEUKOCYTESUR TRACE 08/04/2023 05:24 AM       Radiology Last 7 Days:  CT ABDOMEN PELVIS WO CONTRAST Additional Contrast? None    Result Date: 8/4/2023  Impression: 1. Calculi within the proximal right ureter causing moderate to severe right hydronephrosis. 2.  Bilateral nephrolithiasis  All CT scans are performed using dose optimization techniques as appropriate to the performed exam and include at least one of the following: Automated exposure control, adjustment of the mA and/or kV according to size, and the use of iterative reconstruction technique. ______________________________________ Electronically signed by: Laurie Martinez M.D. Date:     08/04/2023 Time:    06:01       Discharge Plan   Disposition: Home    Provider Follow-Up:   Carmen De La Rosa MD  [address] [place] [id number]  [phone]    Follow up      [address]  [facility]  [address] [address]  Follow up         Hospital/Incidental Findings Requiring Follow-Up:  Nephrolithiasis    Patient Instructions   Diet: regular diet    Activity: activity as tolerated    Other Instructions:   Encouraged him to push oral fluids is much as possible. Follow-up in my office as well as follow-up with urology for ongoing treatment of the stent.     Discharge Medications         Medication List        START taking these medications      phenazopyridine 100 MG tablet  Commonly known as: PYRIDIUM  Take 1 tablet by mouth every 6 hours as needed for Pain     tamsulosin 0.4 MG capsule  Commonly known as: FLOMAX  Take 1 capsule by mouth daily            CONTINUE taking these medications      cyclobenzaprine 10 MG tablet  Commonly known as: FLEXERIL     dexmethylphenidate 10 MG tablet  Commonly known as: FOCALIN     diazePAM 10 MG tablet  Commonly known as: VALIUM     dicyclomine 10 MG capsule  Commonly known as: BENTYL     ibuprofen 800 MG tablet  Commonly known as: ADVIL;MOTRIN     omeprazole 20 MG delayed release capsule  Commonly known as: PRILOSEC     pregabalin 150 MG capsule  Commonly known as: LYRICA     rosuvastatin 5 MG tablet  Commonly known as: CRESTOR     sildenafil 100 MG tablet  Commonly known as: VIAGRA            STOP taking these medications      PREVACID PO               Where to Get Your Medications        You can get these medications from any pharmacy    Bring a paper prescription for each of these medications  phenazopyridine 100 MG tablet  tamsulosin 0.4 MG capsule         Electronically signed by Hanny Rodriguez MD on 8/5/23 at 8:08 AM CDT

## 2023-08-06 LAB — BACTERIA UR CULT: NORMAL

## 2023-08-14 ENCOUNTER — OFFICE VISIT (OUTPATIENT)
Dept: UROLOGY | Age: 53
End: 2023-08-14
Payer: COMMERCIAL

## 2023-08-14 ENCOUNTER — HOSPITAL ENCOUNTER (OUTPATIENT)
Dept: GENERAL RADIOLOGY | Age: 53
Discharge: HOME | End: 2023-08-14
Payer: COMMERCIAL

## 2023-08-14 VITALS — TEMPERATURE: 98.1 F | HEIGHT: 70 IN | BODY MASS INDEX: 28.26 KG/M2 | WEIGHT: 197.4 LBS

## 2023-08-14 DIAGNOSIS — N20.1 RIGHT URETERAL CALCULUS: ICD-10-CM

## 2023-08-14 DIAGNOSIS — N20.1 CALCULUS OF PROXIMAL RIGHT URETER: Primary | ICD-10-CM

## 2023-08-14 DIAGNOSIS — N20.0 BILATERAL RENAL STONES: ICD-10-CM

## 2023-08-14 LAB
BACTERIA URINE, POC: 0
BILIRUBIN URINE: 0 MG/DL
BLOOD, URINE: POSITIVE
CASTS URINE, POC: 0
CLARITY: CLEAR
COLOR: YELLOW
CRYSTALS URINE, POC: 0
EPI CELLS URINE, POC: 0
GLUCOSE URINE: NORMAL
KETONES, URINE: NEGATIVE
LEUKOCYTE EST, POC: NORMAL
NITRITE, URINE: NEGATIVE
PH UA: 6 (ref 4.5–8)
PROTEIN UA: NEGATIVE
RBC URINE, POC: 6
SPECIFIC GRAVITY UA: 1.01 (ref 1–1.03)
UROBILINOGEN, URINE: NORMAL
WBC URINE, POC: 1
YEAST URINE, POC: 0

## 2023-08-14 PROCEDURE — 81001 URINALYSIS AUTO W/SCOPE: CPT | Performed by: NURSE PRACTITIONER

## 2023-08-14 PROCEDURE — 74018 RADEX ABDOMEN 1 VIEW: CPT

## 2023-08-14 PROCEDURE — 99214 OFFICE O/P EST MOD 30 MIN: CPT | Performed by: NURSE PRACTITIONER

## 2023-08-14 NOTE — H&P (VIEW-ONLY)
baseline. Psychiatric:         Mood and Affect: Mood normal.         Behavior: Behavior normal.     DATA:  CBC with Differential:    Lab Results   Component Value Date/Time    WBC 5.6 08/05/2023 01:31 AM    RBC 4.59 08/05/2023 01:31 AM    HGB 13.6 08/05/2023 01:31 AM    HCT 42.8 08/05/2023 01:31 AM     08/05/2023 01:31 AM    MCV 93.2 08/05/2023 01:31 AM    MCH 29.6 08/05/2023 01:31 AM    MCHC 31.8 08/05/2023 01:31 AM    RDW 13.9 08/05/2023 01:31 AM    LYMPHOPCT 10.0 08/05/2023 01:31 AM    MONOPCT 2.0 08/05/2023 01:31 AM    BASOPCT 0.0 08/05/2023 01:31 AM    MONOSABS 0.10 08/05/2023 01:31 AM    LYMPHSABS 0.6 08/05/2023 01:31 AM    EOSABS 0.00 08/05/2023 01:31 AM    BASOSABS 0.00 08/05/2023 01:31 AM     CMP:    Lab Results   Component Value Date/Time     08/05/2023 01:31 AM    K 4.0 08/05/2023 01:31 AM     08/05/2023 01:31 AM    CO2 25 08/05/2023 01:31 AM    BUN 17 08/05/2023 01:31 AM    CREATININE 1.3 08/05/2023 01:31 AM    LABGLOM >60 08/05/2023 01:31 AM    GLUCOSE 175 08/05/2023 01:31 AM    PROT 6.7 08/05/2023 01:31 AM    LABALBU 3.8 08/05/2023 01:31 AM    CALCIUM 9.0 08/05/2023 01:31 AM    BILITOT <0.2 08/05/2023 01:31 AM    ALKPHOS 76 08/05/2023 01:31 AM    AST 21 08/05/2023 01:31 AM    ALT 15 08/05/2023 01:31 AM     Results for orders placed or performed in visit on 08/14/23   POCT Urinalysis Dipstick w/ Micro (Auto)   Result Value Ref Range    Color, UA Yellow     Clarity, UA Clear Clear    Glucose, Ur NEG     Bilirubin Urine 0 mg/dL    Ketones, Urine Negative     Specific Gravity, UA 1.010 1.005 - 1.030    Blood, Urine Positive     pH, UA 6 4.5 - 8.0    Protein, UA Negative Negative    Nitrite, Urine Negative     Leukocytes, UA SMALL     Urobilinogen, Urine Normal     RBC Urine, POC 6     WBC Urine, POC 1     Bacteria Urine, POC 0     yeast urine, poc 0     Casts Urine, POC 0     Epi Cells Urine, POC 0     crystals urine, poc 0         IMAGING:  I reviewed CT from 8//23.   This does reveal

## 2023-08-14 NOTE — PROGRESS NOTES
Teresita Mario is a 48 y.o. male who presents today   Chief Complaint   Patient presents with    Follow-up     I am here today for my post op, to schedule stone surgery        Patient is a 56-year-old male who presents to clinic today to schedule definitive stone treatment. Presented to the ED on 8/4/2023 with complaints of right flank pain. CT revealed an 8 mm right proximal ureteral stone with hydronephrosis and bilateral renal stones. History of stones her last 25 years. Had ESWL x2 and ureteroscopy stone procedure with stenting in the past.  He passed a stone around 1 month ago on his own. Due to severe pain and worsening renal function with a creatinine up to 1.6 GFR 52 he was taken for a stent placement with Dr. Gurinder Husain on 8/4/23. He comes in today to discuss surgical options. KUB was not obtained prior to office visit. Today he denies any significant pain is tolerating stent. Denies any lower urinary tract symptoms, fever or chills. Past Medical History:   Diagnosis Date    Hypertension     Kidney stone        Past Surgical History:   Procedure Laterality Date    BACK SURGERY      CERVICAL SPINE SURGERY      CYSTOSCOPY Right 8/4/2023    RETROGRADE CYSTOSCOPY RIGHT URETERAL STENT PLACEMENT performed by Geno Chirinos MD at [address] [place] [address]         Current Outpatient Medications   Medication Sig Dispense Refill    tamsulosin (FLOMAX) 0.4 MG capsule Take 1 capsule by mouth daily 30 capsule 3    diazePAM (VALIUM) 10 MG tablet Take 1 tablet by mouth every 8 hours as needed for Anxiety.       omeprazole (PRILOSEC) 20 MG delayed release capsule Take 1 capsule by mouth daily      sildenafil (VIAGRA) 100 MG tablet Take 1 tablet by mouth as needed for Erectile Dysfunction      cyclobenzaprine (FLEXERIL) 10 MG tablet Take 1 tablet by mouth 3 times daily as needed for Muscle spasms      dicyclomine (BENTYL) 10 MG capsule Take 1 capsule by mouth 4 times daily

## 2023-08-16 ASSESSMENT — ENCOUNTER SYMPTOMS
NAUSEA: 0
ABDOMINAL DISTENTION: 0
ABDOMINAL PAIN: 0
VOMITING: 0
BACK PAIN: 0

## 2023-08-21 ENCOUNTER — HOSPITAL ENCOUNTER (OUTPATIENT)
Dept: PREADMISSION TESTING | Age: 53
Discharge: HOME | End: 2023-08-25
Payer: COMMERCIAL

## 2023-08-21 VITALS — BODY MASS INDEX: 27.55 KG/M2 | WEIGHT: 192 LBS

## 2023-08-21 LAB
ANION GAP SERPL CALCULATED.3IONS-SCNC: 8 MMOL/L (ref 7–19)
APTT PPP: 29.7 SEC (ref 26–36.2)
BASOPHILS # BLD: 0 K/UL (ref 0–0.2)
BASOPHILS NFR BLD: 0.5 % (ref 0–1)
BUN SERPL-MCNC: 14 MG/DL (ref 6–20)
CALCIUM SERPL-MCNC: 10 MG/DL (ref 8.6–10)
CHLORIDE SERPL-SCNC: 107 MMOL/L (ref 98–111)
CLOSURE TME BLD-IMP: NORMAL
CLOSURE TME COLL+EPINEP BLD: 115 SEC (ref 84–176)
CO2 SERPL-SCNC: 27 MMOL/L (ref 22–29)
CREAT SERPL-MCNC: 0.9 MG/DL (ref 0.5–1.2)
EOSINOPHIL # BLD: 0.1 K/UL (ref 0–0.6)
EOSINOPHIL NFR BLD: 1.7 % (ref 0–5)
ERYTHROCYTE [DISTWIDTH] IN BLOOD BY AUTOMATED COUNT: 13.7 % (ref 11.5–14.5)
GLUCOSE SERPL-MCNC: 84 MG/DL (ref 74–109)
HCT VFR BLD AUTO: 45.8 % (ref 42–52)
HGB BLD-MCNC: 14.9 G/DL (ref 14–18)
IMM GRANULOCYTES # BLD: 0 K/UL
INR PPP: 0.98 (ref 0.88–1.18)
LYMPHOCYTES # BLD: 1.3 K/UL (ref 1.1–4.5)
LYMPHOCYTES NFR BLD: 20.4 % (ref 20–40)
MCH RBC QN AUTO: 30.1 PG (ref 27–31)
MCHC RBC AUTO-ENTMCNC: 32.5 G/DL (ref 33–37)
MCV RBC AUTO: 92.5 FL (ref 80–94)
MONOCYTES # BLD: 0.5 K/UL (ref 0–0.9)
MONOCYTES NFR BLD: 7.1 % (ref 0–10)
NEUTROPHILS # BLD: 4.5 K/UL (ref 1.5–7.5)
NEUTS SEG NFR BLD: 70 % (ref 50–65)
PLATELET # BLD AUTO: 204 K/UL (ref 130–400)
PMV BLD AUTO: 13.4 FL (ref 9.4–12.4)
POTASSIUM SERPL-SCNC: 4.7 MMOL/L (ref 3.5–5)
PROTHROMBIN TIME: 12.7 SEC (ref 12–14.6)
RBC # BLD AUTO: 4.95 M/UL (ref 4.7–6.1)
SODIUM SERPL-SCNC: 142 MMOL/L (ref 136–145)
WBC # BLD AUTO: 6.5 K/UL (ref 4.8–10.8)

## 2023-08-21 PROCEDURE — 85025 COMPLETE CBC W/AUTO DIFF WBC: CPT

## 2023-08-21 PROCEDURE — 85576 BLOOD PLATELET AGGREGATION: CPT

## 2023-08-21 PROCEDURE — 85610 PROTHROMBIN TIME: CPT

## 2023-08-21 PROCEDURE — 80048 BASIC METABOLIC PNL TOTAL CA: CPT

## 2023-08-21 PROCEDURE — 85730 THROMBOPLASTIN TIME PARTIAL: CPT

## 2023-08-21 NOTE — DISCHARGE INSTRUCTIONS
PREOPERATIVE GUIDELINES WHEN RECEIVING ANESTHESIA    Do not eat or drink anything after midnight, the night before your surgery. No gum or candy the morning of surgery. This is extremely important for your safety. Take a bath (or shower) the night before your surgery and you may brush your teeth the morning of your surgery. You will be scheduled to arrive at the hospital 2 hours before your surgery, or follow your surgeon's instructions. Dress comfortably. Wear loose clothing that will be easy to remove and comfortable for your trip home. You may wear eyeglasses or contacts but bring your cases with you as they must be remove before your surgery. Hearing aids and dentures will need to be removed before your surgery. Do not wear any jewelry, including body jewelry. All jewelry will need to be removed prior to your surgery. Do not wear fingernail polish or make-up. It is best not to bring any valuables with you. If you are to stay in the hospital overnight, bring your robe, slippers and personal toiletries that you may need. POSTOPERATIVE GUIDELINES AFTER RECEIVING ANESTHESIA    If you are to go home after your surgery, you will need a responsible adult to drive you home. You will not be able to take public transportation after your discharge from the Operative Care Unit unless you are accompanied by a        responsible adult. On returning home, be sure to follow your physician's orders regarding diet, activity and medications. Remember, surgery with general anesthesia or sedation may leave you sleepy, very tired and with a decreased appetite for 12 to 24 hours. If you develop any post-surgical complications or problems, call your surgeon or [facility] Emergency Department ([phone]). The day before surgery you will receive a phone call from the surgery nurse to let you know what time to arrive on the day of surgery.  This call will usually be between 2-4 PM. If

## 2023-08-22 ENCOUNTER — ANESTHESIA (OUTPATIENT)
Dept: OPERATING ROOM | Age: 53
End: 2023-08-22
Payer: COMMERCIAL

## 2023-08-22 ENCOUNTER — APPOINTMENT (OUTPATIENT)
Dept: GENERAL RADIOLOGY | Age: 53
End: 2023-08-22
Attending: UROLOGY
Payer: COMMERCIAL

## 2023-08-22 ENCOUNTER — ANESTHESIA EVENT (OUTPATIENT)
Dept: OPERATING ROOM | Age: 53
End: 2023-08-22
Payer: COMMERCIAL

## 2023-08-22 ENCOUNTER — HOSPITAL ENCOUNTER (OUTPATIENT)
Age: 53
Setting detail: OUTPATIENT SURGERY
Discharge: HOME | End: 2023-08-22
Attending: UROLOGY | Admitting: UROLOGY
Payer: COMMERCIAL

## 2023-08-22 VITALS
TEMPERATURE: 97.2 F | SYSTOLIC BLOOD PRESSURE: 126 MMHG | HEART RATE: 62 BPM | BODY MASS INDEX: 27.2 KG/M2 | DIASTOLIC BLOOD PRESSURE: 85 MMHG | WEIGHT: 190 LBS | RESPIRATION RATE: 14 BRPM | HEIGHT: 70 IN | OXYGEN SATURATION: 97 %

## 2023-08-22 DIAGNOSIS — N20.1 URETERAL CALCULUS, RIGHT: Primary | ICD-10-CM

## 2023-08-22 DIAGNOSIS — N20.0 RENAL CALCULUS, RIGHT: ICD-10-CM

## 2023-08-22 DIAGNOSIS — N20.1 RIGHT URETERAL CALCULUS: ICD-10-CM

## 2023-08-22 PROBLEM — Z96.0 URETERAL STENT PRESENT: Status: ACTIVE | Noted: 2023-08-22

## 2023-08-22 PROBLEM — T83.122A DISPLACEMENT OF URETERAL STENT (HCC): Status: ACTIVE | Noted: 2023-08-22

## 2023-08-22 LAB
HCT VFR BLD AUTO: 42.3 % (ref 42–52)
HGB BLD-MCNC: 13.6 G/DL (ref 14–18)

## 2023-08-22 PROCEDURE — 2720000010 HC SURG SUPPLY STERILE: Performed by: UROLOGY

## 2023-08-22 PROCEDURE — 85018 HEMOGLOBIN: CPT

## 2023-08-22 PROCEDURE — 3600000004 HC SURGERY LEVEL 4 BASE: Performed by: UROLOGY

## 2023-08-22 PROCEDURE — 6360000004 HC RX CONTRAST MEDICATION: Performed by: UROLOGY

## 2023-08-22 PROCEDURE — 3600000014 HC SURGERY LEVEL 4 ADDTL 15MIN: Performed by: UROLOGY

## 2023-08-22 PROCEDURE — 2500000003 HC RX 250 WO HCPCS: Performed by: NURSE ANESTHETIST, CERTIFIED REGISTERED

## 2023-08-22 PROCEDURE — 2580000003 HC RX 258: Performed by: NURSE ANESTHETIST, CERTIFIED REGISTERED

## 2023-08-22 PROCEDURE — 6360000002 HC RX W HCPCS: Performed by: NURSE ANESTHETIST, CERTIFIED REGISTERED

## 2023-08-22 PROCEDURE — 74018 RADEX ABDOMEN 1 VIEW: CPT

## 2023-08-22 PROCEDURE — 3700000001 HC ADD 15 MINUTES (ANESTHESIA): Performed by: UROLOGY

## 2023-08-22 PROCEDURE — 2580000003 HC RX 258: Performed by: ANESTHESIOLOGY

## 2023-08-22 PROCEDURE — 88300 SURGICAL PATH GROSS: CPT

## 2023-08-22 PROCEDURE — 50590 FRAGMENTING OF KIDNEY STONE: CPT | Performed by: UROLOGY

## 2023-08-22 PROCEDURE — 6360000002 HC RX W HCPCS: Performed by: UROLOGY

## 2023-08-22 PROCEDURE — 3700000000 HC ANESTHESIA ATTENDED CARE: Performed by: UROLOGY

## 2023-08-22 PROCEDURE — C1769 GUIDE WIRE: HCPCS | Performed by: UROLOGY

## 2023-08-22 PROCEDURE — C1758 CATHETER, URETERAL: HCPCS | Performed by: UROLOGY

## 2023-08-22 PROCEDURE — 7100000000 HC PACU RECOVERY - FIRST 15 MIN: Performed by: UROLOGY

## 2023-08-22 PROCEDURE — 82360 CALCULUS ASSAY QUANT: CPT

## 2023-08-22 PROCEDURE — 7100000011 HC PHASE II RECOVERY - ADDTL 15 MIN: Performed by: UROLOGY

## 2023-08-22 PROCEDURE — C2617 STENT, NON-COR, TEM W/O DEL: HCPCS | Performed by: UROLOGY

## 2023-08-22 PROCEDURE — C9399 UNCLASSIFIED DRUGS OR BIOLOG: HCPCS | Performed by: NURSE ANESTHETIST, CERTIFIED REGISTERED

## 2023-08-22 PROCEDURE — 85014 HEMATOCRIT: CPT

## 2023-08-22 PROCEDURE — 7100000001 HC PACU RECOVERY - ADDTL 15 MIN: Performed by: UROLOGY

## 2023-08-22 PROCEDURE — 2709999900 HC NON-CHARGEABLE SUPPLY: Performed by: UROLOGY

## 2023-08-22 PROCEDURE — 7100000010 HC PHASE II RECOVERY - FIRST 15 MIN: Performed by: UROLOGY

## 2023-08-22 PROCEDURE — 36415 COLL VENOUS BLD VENIPUNCTURE: CPT

## 2023-08-22 PROCEDURE — 6370000000 HC RX 637 (ALT 250 FOR IP): Performed by: UROLOGY

## 2023-08-22 DEVICE — URETERAL STENT WITH SIDE HOLES 6FX28CM
Type: IMPLANTABLE DEVICE | Site: URETER | Status: FUNCTIONAL
Brand: TRIA™ SOFT

## 2023-08-22 RX ORDER — SODIUM CHLORIDE 0.9 % (FLUSH) 0.9 %
5-40 SYRINGE (ML) INJECTION PRN
Status: DISCONTINUED | OUTPATIENT
Start: 2023-08-22 | End: 2023-08-22 | Stop reason: HOSPADM

## 2023-08-22 RX ORDER — HYDROMORPHONE HYDROCHLORIDE 1 MG/ML
0.5 INJECTION, SOLUTION INTRAMUSCULAR; INTRAVENOUS; SUBCUTANEOUS
Status: DISCONTINUED | OUTPATIENT
Start: 2023-08-22 | End: 2023-08-22 | Stop reason: HOSPADM

## 2023-08-22 RX ORDER — FAMOTIDINE 20 MG/1
20 TABLET, FILM COATED ORAL ONCE
Status: DISCONTINUED | OUTPATIENT
Start: 2023-08-22 | End: 2023-08-22 | Stop reason: HOSPADM

## 2023-08-22 RX ORDER — OXYCODONE HYDROCHLORIDE AND ACETAMINOPHEN 5; 325 MG/1; MG/1
1 TABLET ORAL EVERY 6 HOURS PRN
Qty: 12 TABLET | Refills: 0 | Status: SHIPPED | OUTPATIENT
Start: 2023-08-22 | End: 2023-08-25

## 2023-08-22 RX ORDER — PHENAZOPYRIDINE HYDROCHLORIDE 100 MG/1
100 TABLET, FILM COATED ORAL ONCE
Status: COMPLETED | OUTPATIENT
Start: 2023-08-22 | End: 2023-08-22

## 2023-08-22 RX ORDER — GLYCOPYRROLATE 0.2 MG/ML
INJECTION INTRAMUSCULAR; INTRAVENOUS PRN
Status: DISCONTINUED | OUTPATIENT
Start: 2023-08-22 | End: 2023-08-22 | Stop reason: SDUPTHER

## 2023-08-22 RX ORDER — SODIUM CHLORIDE, SODIUM LACTATE, POTASSIUM CHLORIDE, CALCIUM CHLORIDE 600; 310; 30; 20 MG/100ML; MG/100ML; MG/100ML; MG/100ML
INJECTION, SOLUTION INTRAVENOUS CONTINUOUS PRN
Status: DISCONTINUED | OUTPATIENT
Start: 2023-08-22 | End: 2023-08-22 | Stop reason: SDUPTHER

## 2023-08-22 RX ORDER — SODIUM CHLORIDE, SODIUM LACTATE, POTASSIUM CHLORIDE, CALCIUM CHLORIDE 600; 310; 30; 20 MG/100ML; MG/100ML; MG/100ML; MG/100ML
INJECTION, SOLUTION INTRAVENOUS CONTINUOUS
Status: DISCONTINUED | OUTPATIENT
Start: 2023-08-22 | End: 2023-08-22 | Stop reason: HOSPADM

## 2023-08-22 RX ORDER — METOCLOPRAMIDE HYDROCHLORIDE 5 MG/ML
10 INJECTION INTRAMUSCULAR; INTRAVENOUS
Status: DISCONTINUED | OUTPATIENT
Start: 2023-08-22 | End: 2023-08-22 | Stop reason: HOSPADM

## 2023-08-22 RX ORDER — ROCURONIUM BROMIDE 10 MG/ML
INJECTION, SOLUTION INTRAVENOUS PRN
Status: DISCONTINUED | OUTPATIENT
Start: 2023-08-22 | End: 2023-08-22 | Stop reason: SDUPTHER

## 2023-08-22 RX ORDER — ONDANSETRON 2 MG/ML
4 INJECTION INTRAMUSCULAR; INTRAVENOUS
Status: DISCONTINUED | OUTPATIENT
Start: 2023-08-22 | End: 2023-08-22 | Stop reason: HOSPADM

## 2023-08-22 RX ORDER — TAMSULOSIN HYDROCHLORIDE 0.4 MG/1
0.4 CAPSULE ORAL ONCE
Status: COMPLETED | OUTPATIENT
Start: 2023-08-22 | End: 2023-08-22

## 2023-08-22 RX ORDER — LIDOCAINE HYDROCHLORIDE 10 MG/ML
INJECTION, SOLUTION EPIDURAL; INFILTRATION; INTRACAUDAL; PERINEURAL PRN
Status: DISCONTINUED | OUTPATIENT
Start: 2023-08-22 | End: 2023-08-22 | Stop reason: SDUPTHER

## 2023-08-22 RX ORDER — ONDANSETRON 2 MG/ML
4 INJECTION INTRAMUSCULAR; INTRAVENOUS EVERY 4 HOURS PRN
Status: DISCONTINUED | OUTPATIENT
Start: 2023-08-22 | End: 2023-08-22 | Stop reason: HOSPADM

## 2023-08-22 RX ORDER — SCOLOPAMINE TRANSDERMAL SYSTEM 1 MG/1
1 PATCH, EXTENDED RELEASE TRANSDERMAL
Status: DISCONTINUED | OUTPATIENT
Start: 2023-08-22 | End: 2023-08-22 | Stop reason: HOSPADM

## 2023-08-22 RX ORDER — PROPOFOL 10 MG/ML
INJECTION, EMULSION INTRAVENOUS PRN
Status: DISCONTINUED | OUTPATIENT
Start: 2023-08-22 | End: 2023-08-22 | Stop reason: SDUPTHER

## 2023-08-22 RX ORDER — MIDAZOLAM HYDROCHLORIDE 2 MG/2ML
2 INJECTION, SOLUTION INTRAMUSCULAR; INTRAVENOUS
Status: DISCONTINUED | OUTPATIENT
Start: 2023-08-22 | End: 2023-08-22 | Stop reason: HOSPADM

## 2023-08-22 RX ORDER — DIPHENHYDRAMINE HYDROCHLORIDE 50 MG/ML
12.5 INJECTION INTRAMUSCULAR; INTRAVENOUS
Status: DISCONTINUED | OUTPATIENT
Start: 2023-08-22 | End: 2023-08-22 | Stop reason: HOSPADM

## 2023-08-22 RX ORDER — LABETALOL HYDROCHLORIDE 5 MG/ML
10 INJECTION, SOLUTION INTRAVENOUS
Status: DISCONTINUED | OUTPATIENT
Start: 2023-08-22 | End: 2023-08-22 | Stop reason: HOSPADM

## 2023-08-22 RX ORDER — SODIUM CHLORIDE 9 MG/ML
INJECTION, SOLUTION INTRAVENOUS PRN
Status: DISCONTINUED | OUTPATIENT
Start: 2023-08-22 | End: 2023-08-22 | Stop reason: HOSPADM

## 2023-08-22 RX ORDER — IPRATROPIUM BROMIDE AND ALBUTEROL SULFATE 2.5; .5 MG/3ML; MG/3ML
1 SOLUTION RESPIRATORY (INHALATION)
Status: DISCONTINUED | OUTPATIENT
Start: 2023-08-22 | End: 2023-08-22 | Stop reason: HOSPADM

## 2023-08-22 RX ORDER — SODIUM CHLORIDE 9 MG/ML
INJECTION, SOLUTION INTRAVENOUS CONTINUOUS
Status: DISCONTINUED | OUTPATIENT
Start: 2023-08-22 | End: 2023-08-22 | Stop reason: HOSPADM

## 2023-08-22 RX ORDER — HYDROMORPHONE HYDROCHLORIDE 1 MG/ML
0.5 INJECTION, SOLUTION INTRAMUSCULAR; INTRAVENOUS; SUBCUTANEOUS EVERY 5 MIN PRN
Status: DISCONTINUED | OUTPATIENT
Start: 2023-08-22 | End: 2023-08-22 | Stop reason: HOSPADM

## 2023-08-22 RX ORDER — LIDOCAINE HYDROCHLORIDE 10 MG/ML
1 INJECTION, SOLUTION EPIDURAL; INFILTRATION; INTRACAUDAL; PERINEURAL
Status: DISCONTINUED | OUTPATIENT
Start: 2023-08-22 | End: 2023-08-22 | Stop reason: HOSPADM

## 2023-08-22 RX ORDER — CEFTRIAXONE 1 G/1
INJECTION, POWDER, FOR SOLUTION INTRAMUSCULAR; INTRAVENOUS PRN
Status: DISCONTINUED | OUTPATIENT
Start: 2023-08-22 | End: 2023-08-22 | Stop reason: SDUPTHER

## 2023-08-22 RX ORDER — SODIUM CHLORIDE 0.9 % (FLUSH) 0.9 %
5-40 SYRINGE (ML) INJECTION EVERY 12 HOURS SCHEDULED
Status: DISCONTINUED | OUTPATIENT
Start: 2023-08-22 | End: 2023-08-22 | Stop reason: HOSPADM

## 2023-08-22 RX ORDER — OXYCODONE HYDROCHLORIDE AND ACETAMINOPHEN 5; 325 MG/1; MG/1
2 TABLET ORAL EVERY 4 HOURS PRN
Status: DISCONTINUED | OUTPATIENT
Start: 2023-08-22 | End: 2023-08-22 | Stop reason: HOSPADM

## 2023-08-22 RX ORDER — MEPERIDINE HYDROCHLORIDE 25 MG/ML
12.5 INJECTION INTRAMUSCULAR; INTRAVENOUS; SUBCUTANEOUS EVERY 5 MIN PRN
Status: DISCONTINUED | OUTPATIENT
Start: 2023-08-22 | End: 2023-08-22 | Stop reason: HOSPADM

## 2023-08-22 RX ORDER — PHENAZOPYRIDINE HYDROCHLORIDE 100 MG/1
100 TABLET, FILM COATED ORAL 3 TIMES DAILY PRN
Qty: 30 TABLET | Refills: 1 | Status: SHIPPED | OUTPATIENT
Start: 2023-08-22

## 2023-08-22 RX ORDER — KETOROLAC TROMETHAMINE 30 MG/ML
15 INJECTION, SOLUTION INTRAMUSCULAR; INTRAVENOUS ONCE
Status: COMPLETED | OUTPATIENT
Start: 2023-08-22 | End: 2023-08-22

## 2023-08-22 RX ORDER — ONDANSETRON 2 MG/ML
INJECTION INTRAMUSCULAR; INTRAVENOUS PRN
Status: DISCONTINUED | OUTPATIENT
Start: 2023-08-22 | End: 2023-08-22 | Stop reason: SDUPTHER

## 2023-08-22 RX ORDER — MIDAZOLAM HYDROCHLORIDE 1 MG/ML
INJECTION INTRAMUSCULAR; INTRAVENOUS PRN
Status: DISCONTINUED | OUTPATIENT
Start: 2023-08-22 | End: 2023-08-22 | Stop reason: SDUPTHER

## 2023-08-22 RX ORDER — FENTANYL CITRATE 50 UG/ML
INJECTION, SOLUTION INTRAMUSCULAR; INTRAVENOUS PRN
Status: DISCONTINUED | OUTPATIENT
Start: 2023-08-22 | End: 2023-08-22 | Stop reason: SDUPTHER

## 2023-08-22 RX ORDER — HYDROMORPHONE HYDROCHLORIDE 1 MG/ML
0.25 INJECTION, SOLUTION INTRAMUSCULAR; INTRAVENOUS; SUBCUTANEOUS EVERY 5 MIN PRN
Status: DISCONTINUED | OUTPATIENT
Start: 2023-08-22 | End: 2023-08-22 | Stop reason: HOSPADM

## 2023-08-22 RX ORDER — DEXAMETHASONE SODIUM PHOSPHATE 10 MG/ML
INJECTION, SOLUTION INTRAMUSCULAR; INTRAVENOUS PRN
Status: DISCONTINUED | OUTPATIENT
Start: 2023-08-22 | End: 2023-08-22 | Stop reason: SDUPTHER

## 2023-08-22 RX ORDER — EPHEDRINE SULFATE 50 MG/ML
INJECTION, SOLUTION INTRAVENOUS PRN
Status: DISCONTINUED | OUTPATIENT
Start: 2023-08-22 | End: 2023-08-22 | Stop reason: SDUPTHER

## 2023-08-22 RX ORDER — CIPROFLOXACIN 500 MG/1
500 TABLET, FILM COATED ORAL 2 TIMES DAILY
Qty: 6 TABLET | Refills: 0 | Status: SHIPPED | OUTPATIENT
Start: 2023-08-22 | End: 2023-08-25

## 2023-08-22 RX ADMIN — EPHEDRINE SULFATE 10 MG: 50 INJECTION INTRAMUSCULAR; INTRAVENOUS; SUBCUTANEOUS at 09:11

## 2023-08-22 RX ADMIN — SODIUM CHLORIDE, POTASSIUM CHLORIDE, SODIUM LACTATE AND CALCIUM CHLORIDE: 600; 310; 30; 20 INJECTION, SOLUTION INTRAVENOUS at 07:04

## 2023-08-22 RX ADMIN — GLYCOPYRROLATE 0.2 MG: 0.2 INJECTION, SOLUTION INTRAMUSCULAR; INTRAVENOUS at 09:43

## 2023-08-22 RX ADMIN — SODIUM CHLORIDE, SODIUM LACTATE, POTASSIUM CHLORIDE, AND CALCIUM CHLORIDE: 600; 310; 30; 20 INJECTION, SOLUTION INTRAVENOUS at 08:20

## 2023-08-22 RX ADMIN — MIDAZOLAM 2 MG: 1 INJECTION INTRAMUSCULAR; INTRAVENOUS at 08:26

## 2023-08-22 RX ADMIN — PHENYLEPHRINE HYDROCHLORIDE 100 MCG: 10 INJECTION INTRAVENOUS at 09:19

## 2023-08-22 RX ADMIN — PHENAZOPYRIDINE HYDROCHLORIDE 100 MG: 100 TABLET, FILM COATED ORAL at 12:10

## 2023-08-22 RX ADMIN — ONDANSETRON 4 MG: 2 INJECTION INTRAMUSCULAR; INTRAVENOUS at 08:28

## 2023-08-22 RX ADMIN — LIDOCAINE HYDROCHLORIDE 50 MG: 10 INJECTION, SOLUTION EPIDURAL; INFILTRATION; INTRACAUDAL; PERINEURAL at 08:28

## 2023-08-22 RX ADMIN — TAMSULOSIN HYDROCHLORIDE 0.4 MG: 0.4 CAPSULE ORAL at 12:10

## 2023-08-22 RX ADMIN — FENTANYL CITRATE 100 MCG: 0.05 INJECTION, SOLUTION INTRAMUSCULAR; INTRAVENOUS at 08:28

## 2023-08-22 RX ADMIN — PHENYLEPHRINE HYDROCHLORIDE 100 MCG: 10 INJECTION INTRAVENOUS at 10:07

## 2023-08-22 RX ADMIN — PHENYLEPHRINE HYDROCHLORIDE 100 MCG: 10 INJECTION INTRAVENOUS at 10:31

## 2023-08-22 RX ADMIN — PHENYLEPHRINE HYDROCHLORIDE 100 MCG: 10 INJECTION INTRAVENOUS at 09:11

## 2023-08-22 RX ADMIN — EPHEDRINE SULFATE 10 MG: 50 INJECTION INTRAMUSCULAR; INTRAVENOUS; SUBCUTANEOUS at 09:19

## 2023-08-22 RX ADMIN — CEFTRIAXONE SODIUM 1 G: 1 INJECTION, POWDER, FOR SOLUTION INTRAMUSCULAR; INTRAVENOUS at 08:28

## 2023-08-22 RX ADMIN — PHENYLEPHRINE HYDROCHLORIDE 100 MCG: 10 INJECTION INTRAVENOUS at 08:54

## 2023-08-22 RX ADMIN — PROPOFOL 200 MG: 10 INJECTION, EMULSION INTRAVENOUS at 08:28

## 2023-08-22 RX ADMIN — SUGAMMADEX 100 MG: 100 INJECTION, SOLUTION INTRAVENOUS at 10:56

## 2023-08-22 RX ADMIN — EPHEDRINE SULFATE 10 MG: 50 INJECTION INTRAMUSCULAR; INTRAVENOUS; SUBCUTANEOUS at 08:54

## 2023-08-22 RX ADMIN — EPHEDRINE SULFATE 10 MG: 50 INJECTION INTRAMUSCULAR; INTRAVENOUS; SUBCUTANEOUS at 09:46

## 2023-08-22 RX ADMIN — KETOROLAC TROMETHAMINE 15 MG: 30 INJECTION, SOLUTION INTRAMUSCULAR; INTRAVENOUS at 11:58

## 2023-08-22 RX ADMIN — PHENYLEPHRINE HYDROCHLORIDE 100 MCG: 10 INJECTION INTRAVENOUS at 09:46

## 2023-08-22 RX ADMIN — ROCURONIUM BROMIDE 50 MG: 10 INJECTION, SOLUTION INTRAVENOUS at 08:28

## 2023-08-22 RX ADMIN — DEXAMETHASONE SODIUM PHOSPHATE 10 MG: 10 INJECTION, SOLUTION INTRAMUSCULAR; INTRAVENOUS at 08:28

## 2023-08-22 ASSESSMENT — PAIN SCALES - GENERAL
PAINLEVEL_OUTOF10: 6
PAINLEVEL_OUTOF10: 5

## 2023-08-22 ASSESSMENT — LIFESTYLE VARIABLES: SMOKING_STATUS: 1

## 2023-08-22 NOTE — ANESTHESIA POSTPROCEDURE EVALUATION
Department of Anesthesiology  Postprocedure Note    Patient: Dixie Huddleston  MRN: [id number]  YOB: 1970  Date of evaluation: 8/22/2023      Procedure Summary     Date: 08/22/23 Room / Location: [address]    Anesthesia Start: 0820 Anesthesia Stop:     Procedures:       RIGHT URETERAL EXTRACORPOREAL SHOCK WAVE LITHOTRIPSY PROXIMAL RENAL CALCULI (Right)      CYSTOSCOPY RIGHT URETERAL STENT REMOVAL FOR RETAINED URETERAL STENT; RIGHT URETEROSCOPY LASER LITHOTRIPSY FOR RIGHT RENAL CALCULI; RIGHT URETERAL STENT PLACEMENT (Right) Diagnosis:       Right ureteral calculus      (Right ureteral calculus [N20.1])    Surgeons: Vignesh Barnett MD Responsible Provider: LYUDMILA Bautista CRNA    Anesthesia Type: general ASA Status: 2          Anesthesia Type: No value filed.     Harley Phase I: Harley Score: 5    Harley Phase II:        Anesthesia Post Evaluation    Patient location during evaluation: PACU  Patient participation: waiting for patient participation  Level of consciousness: lethargic  Pain score: 0  Airway patency: patent  Nausea & Vomiting: no vomiting and no nausea  Complications: no  Cardiovascular status: hemodynamically stable  Respiratory status: oral airway and spontaneous ventilation  Hydration status: stable  Pain management: adequate

## 2023-08-22 NOTE — PROGRESS NOTES
CLINICAL PHARMACY NOTE: MEDS TO BEDS    Total # of Prescriptions Filled: 3   The following medications were delivered to the patient:  Current Discharge Medication List        START taking these medications    Details   phenazopyridine (PYRIDIUM) 100 MG tablet Take 1 tablet by mouth 3 times daily as needed for Pain (for burning and spasms)  Qty: 30 tablet, Refills: 1      oxyCODONE-acetaminophen (PERCOCET) 5-325 MG per tablet Take 1 tablet by mouth every 6 hours as needed for Pain for up to 3 days. Max Daily Amount: 4 tablets  Qty: 12 tablet, Refills: 0    Comments: Reduce doses taken as pain becomes manageable Reduce doses taken as pain becomes manageable  Associated Diagnoses: Ureteral calculus, right; Renal calculus, right      ciprofloxacin (CIPRO) 500 MG tablet Take 1 tablet by mouth 2 times daily for 3 days  Qty: 6 tablet, Refills: 0               Additional Documentation:    Delivered RX's to patient bedside. Paid with cash.

## 2023-08-22 NOTE — INTERVAL H&P NOTE
Update History & Physical    The patient's History and Physical of August 14, 2023 was reviewed with the patient and I examined the patient. There was no change. The surgical site was confirmed by the patient and me. Plan: The risks, benefits, expected outcome, and alternative to the recommended procedure have been discussed with the patient. Patient understands and wants to proceed with the procedure.      Electronically signed by Mike Early MD on 8/22/2023 at 8:10 AM

## 2023-08-22 NOTE — ANESTHESIA PROCEDURE NOTES
Airway  Date/Time: 8/22/2023 8:28 AM  Urgency: elective    Airway not difficult    General Information and Staff    Patient location during procedure: OR  Performed: resident/CRNA     Indications and Patient Condition  Indications for airway management: anesthesia  Spontaneous ventilation: present  Sedation level: deep  Preoxygenated: yes  Patient position: sniffing  MILS maintained throughout  Mask difficulty assessment: vent by bag mask    Final Airway Details  Final airway type: endotracheal airway      Successful airway: ETT  Cuffed: yes   Successful intubation technique: direct laryngoscopy  Facilitating devices/methods: intubating stylet  Endotracheal tube insertion site: oral  Blade: Jennifer  Blade size: #3  ETT size (mm): 7.0  Cormack-Lehane Classification: grade I - full view of glottis  Placement verified by: chest auscultation and capnometry   Inital cuff pressure (cm H2O): 6  Measured from: lips  ETT to lips (cm): 22  Number of attempts at approach: 1  Number of other approaches attempted: 0    Additional Comments  Atraumatic intubation.

## 2023-08-23 NOTE — OP NOTE
Brief Operative Note      Patient: Dasia Robb  YOB: 1970  MRN: [id number]    Date of Procedure: 8/22/2023    Pre-Op Diagnosis Codes:     * Right ureteral calculus [N20.1]     * Renal calculus, right [N20.0]     * Ureteral stent present [Z96.0]    Post-Op Diagnosis: Post-Op Diagnosis Codes:     * Right ureteral calculus [N20.1]     * Renal calculus, right [N20.0]     * Ureteral stent present [Z96.0]     * Displacement of ureteral stent, initial encounter ([address] [facility]) Aubree Giordano       Procedure(s):  RIGHT URETERAL EXTRACORPOREAL SHOCK WAVE LITHOTRIPSY PROXIMAL URETERAL CALCULI  CYSTOSCOPY RIGHT URETERAL STENT REMOVAL FOR RETAINED URETERAL STENT; RIGHT URETEROSCOPY LASER LITHOTRIPSY FOR RIGHT RENAL CALCULI; RIGHT URETERAL STENT PLACEMENT 6x28 TRIA SOFT    Surgeon(s):  Brittney Cabezas MD    Assistant:   * No surgical staff found *    Anesthesia: General    Estimated Blood Loss (mL): 0    Complications: None    Specimens:   ID Type Source Tests Collected by Time Destination   A : RIGHT RENAL CALCULI Stone (Calculus) Bladder SURGICAL PATHOLOGY, STONE ANALYSIS Brittney Cabezas MD 8/22/2023 1058        Implants:  Implant Name Type Inv. Item Serial No.  Lot No. LRB No. Used Action   STENT URETERAL 6 FRX28 CM SOFT MONOFILAMENT TRIA - [id number]  STENT URETERAL 6 FRX28 CM SOFT MONOFILAMENT TRIA  [employer] UROLOGY-WD [id number] Right 1 Implanted         Drains:   [REMOVED] Urinary Catheter 12/11/16 Non-latex (Removed)       Findings: Right proximal ureteral calculus treated with shockwave lithotripsy 3000 shockwaves power level up to 9. Stone did not change or fragment. Patient with multiple right renal calculi. .  Therefore right ureteroscopy laser lithotripsy and stent placement was performed in addition to treat right renal calculi distinct separate stones. On fragmented right ureteral stone was also treated with the laser. 6 [place] by 28 cm Tria soft stent placed with string attached.     Patient
There were some small little fragments that had washed into the  bladder which were washed out. The procedure was then concluded. Estimated blood loss was 0 mL. He was awakened from his anesthetic and  taken to the recovery room in stable condition. His stones all should  be fragmented in small enough fragments, he should be able to easily  pass this. We will have him follow up with NP in 2 weeks with a KUB and  provided there are no large stone fragments, the stent can be removed. He does have a string.           Ana Arellano MD    D: 08/22/2023 12:53:44      T: 08/22/2023 13:01:47     [email or web address]  Job#: [id number]     Doc#: [id number]    CC:

## 2023-08-25 LAB
APPEARANCE STONE: NORMAL
COMPN STONE: NORMAL
SPECIMEN WT: 15 MG

## 2023-09-05 ENCOUNTER — HOSPITAL ENCOUNTER (OUTPATIENT)
Dept: GENERAL RADIOLOGY | Age: 53
Discharge: HOME | End: 2023-09-05
Payer: COMMERCIAL

## 2023-09-05 ENCOUNTER — OFFICE VISIT (OUTPATIENT)
Dept: UROLOGY | Age: 53
End: 2023-09-05
Payer: COMMERCIAL

## 2023-09-05 VITALS — TEMPERATURE: 97.5 F | WEIGHT: 201.2 LBS | HEIGHT: 70 IN | BODY MASS INDEX: 28.8 KG/M2

## 2023-09-05 DIAGNOSIS — N20.1 URETERAL CALCULUS, RIGHT: ICD-10-CM

## 2023-09-05 DIAGNOSIS — N20.0 RENAL CALCULUS, RIGHT: ICD-10-CM

## 2023-09-05 DIAGNOSIS — N20.1 CALCULUS OF PROXIMAL RIGHT URETER: Primary | ICD-10-CM

## 2023-09-05 DIAGNOSIS — N20.0 BILATERAL RENAL STONES: ICD-10-CM

## 2023-09-05 LAB
APPEARANCE FLUID: CLEAR
BILIRUBIN, POC: NORMAL
BLOOD URINE, POC: NORMAL
CLARITY, POC: CLEAR
COLOR, POC: NORMAL
GLUCOSE URINE, POC: NORMAL
KETONES, POC: NORMAL
LEUKOCYTE EST, POC: NORMAL
NITRITE, POC: NORMAL
PH, POC: 6.5
PROTEIN, POC: NORMAL
SPECIFIC GRAVITY, POC: 1
UROBILINOGEN, POC: 0.2

## 2023-09-05 PROCEDURE — 99024 POSTOP FOLLOW-UP VISIT: CPT | Performed by: NURSE PRACTITIONER

## 2023-09-05 PROCEDURE — 74018 RADEX ABDOMEN 1 VIEW: CPT

## 2023-09-05 PROCEDURE — 81002 URINALYSIS NONAUTO W/O SCOPE: CPT | Performed by: NURSE PRACTITIONER

## 2023-09-05 NOTE — PROGRESS NOTES
Patient of Dr. Jones Setting states they are here today to have stent removed post ESWL. Patient denies any fever, chills, nausea or vomiting. The string was located and stent was pulled with no complications. The patient was advised to continue any medications prescribed by [facility] and to push fluids to help with any discomfort. Patient to call if they have any questions or concerns. Patient verbalized understanding. Patient will follow up as scheduled. BALWINDER Elise was in office at time of procedure.

## 2023-09-05 NOTE — PROGRESS NOTES
Tiffanie Lyons is a 48 y.o. male who presents today   Chief Complaint   Patient presents with    Follow-up     I am here today for a stent removal     Patient is 72-year-old male presents clinic today for follow-up stent removal.  He had multiple stones episodes in the past.  He is noted to have a proximal right renal calculus 8 mm. He has stent placed on 8//23 the stone was visible KUB therefore he underwent right ureteral ESWL on 8/22/23. During cystoscopy on removal of stent this was difficult to remove Dr. Naomi Sweet ended up doing a pyeloscopy as well and removing nonobstructing stenosis on the right. Stent was replaced. He comes in today KUB prior for stent removal.  He does have known left lower pole renal stones easily seen on KUB. Patient denies any fever chills, pain. Past Medical History:   Diagnosis Date    Attention deficit disorder     Blunt force injury 2016    hit by 300lb ball at work; resulting in back surgeries    GERD (gastroesophageal reflux disease)     IBS (irritable bowel syndrome)     Kidney stone        Past Surgical History:   Procedure Laterality Date    BACK SURGERY      \"body rejected hardware\";  \"staph infection\"    CERVICAL SPINE SURGERY      CYSTOSCOPY Right 08/04/2023    RETROGRADE CYSTOSCOPY RIGHT URETERAL STENT PLACEMENT performed by Salvador Hernandez MD at [address] [place] [address] Rd    LITHOTRIPSY      LITHOTRIPSY Right 8/22/2023    RIGHT URETERAL EXTRACORPOREAL SHOCK WAVE LITHOTRIPSY PROXIMAL RENAL CALCULI performed by Bryson Henderson MD at [address] [place] [address]    LITHOTRIPSY Right 8/22/2023    CYSTOSCOPY RIGHT URETERAL STENT REMOVAL FOR RETAINED URETERAL STENT; RIGHT URETEROSCOPY LASER LITHOTRIPSY FOR RIGHT RENAL CALCULI; RIGHT URETERAL STENT PLACEMENT performed by Bryson Henderson MD at [facility] [address]   [address]         Current Outpatient Medications   Medication Sig Dispense Refill    phenazopyridine (PYRIDIUM) 100 MG tablet Take 1 tablet by mouth 3 times daily as needed for Pain

## 2023-09-10 ASSESSMENT — ENCOUNTER SYMPTOMS
BACK PAIN: 0
ABDOMINAL PAIN: 0
VOMITING: 0
ABDOMINAL DISTENTION: 0
NAUSEA: 0

## 2023-12-05 ENCOUNTER — HOSPITAL ENCOUNTER (OUTPATIENT)
Dept: GENERAL RADIOLOGY | Age: 53
Discharge: HOME | End: 2023-12-05
Payer: COMMERCIAL

## 2023-12-05 ENCOUNTER — HOSPITAL ENCOUNTER (OUTPATIENT)
Dept: CT IMAGING | Age: 53
Discharge: HOME | End: 2023-12-05
Payer: COMMERCIAL

## 2023-12-05 DIAGNOSIS — N20.0 BILATERAL RENAL STONES: ICD-10-CM

## 2023-12-05 DIAGNOSIS — N20.1 CALCULUS OF PROXIMAL RIGHT URETER: ICD-10-CM

## 2023-12-05 PROCEDURE — 74018 RADEX ABDOMEN 1 VIEW: CPT

## 2023-12-05 PROCEDURE — 74176 CT ABD & PELVIS W/O CONTRAST: CPT

## 2023-12-27 ENCOUNTER — OFFICE VISIT (OUTPATIENT)
Dept: UROLOGY | Age: 53
End: 2023-12-27
Payer: COMMERCIAL

## 2023-12-27 VITALS — HEIGHT: 70 IN | TEMPERATURE: 98.8 F | WEIGHT: 207.4 LBS | BODY MASS INDEX: 29.69 KG/M2

## 2023-12-27 DIAGNOSIS — N20.1 CALCULUS OF PROXIMAL RIGHT URETER: ICD-10-CM

## 2023-12-27 DIAGNOSIS — N20.0 RENAL CALCULUS, LEFT: Primary | ICD-10-CM

## 2023-12-27 LAB
BACTERIA URINE, POC: 0
BILIRUBIN URINE: 0 MG/DL
BLOOD, URINE: POSITIVE
CASTS URINE, POC: 0
CLARITY: CLEAR
COLOR: YELLOW
CRYSTALS URINE, POC: 0
EPI CELLS URINE, POC: ABNORMAL
GLUCOSE URINE: ABNORMAL
KETONES, URINE: NEGATIVE
LEUKOCYTE EST, POC: ABNORMAL
NITRITE, URINE: NEGATIVE
PH UA: 6 (ref 4.5–8)
PROTEIN UA: POSITIVE
RBC URINE, POC: 2
SPECIFIC GRAVITY UA: 1.03 (ref 1–1.03)
UROBILINOGEN, URINE: ABNORMAL
WBC URINE, POC: 3
YEAST URINE, POC: 0

## 2023-12-27 PROCEDURE — 81001 URINALYSIS AUTO W/SCOPE: CPT | Performed by: NURSE PRACTITIONER

## 2023-12-27 PROCEDURE — 99214 OFFICE O/P EST MOD 30 MIN: CPT | Performed by: NURSE PRACTITIONER

## 2023-12-27 NOTE — PROGRESS NOTES
Nohemy Payton is a 48 y.o. male who presents today   Chief Complaint   Patient presents with    Follow-up     I am here today for my 3 mo stone follow up. {[id number]}    Past Medical History:   Diagnosis Date    Attention deficit disorder     Blunt force injury 2016    hit by 300lb ball at work; resulting in back surgeries    GERD (gastroesophageal reflux disease)     IBS (irritable bowel syndrome)     Kidney stone        Past Surgical History:   Procedure Laterality Date    BACK SURGERY      \"body rejected hardware\"; \"staph infection\"    CERVICAL SPINE SURGERY      CYSTOSCOPY Right 08/04/2023    RETROGRADE CYSTOSCOPY RIGHT URETERAL STENT PLACEMENT performed by Moira Mata MD at [address]    LITHOTRIPSY      LITHOTRIPSY Right 8/22/2023    RIGHT URETERAL EXTRACORPOREAL SHOCK WAVE LITHOTRIPSY PROXIMAL RENAL CALCULI performed by Bernadette Patel MD at [address]    LITHOTRIPSY Right 8/22/2023    CYSTOSCOPY RIGHT URETERAL STENT REMOVAL FOR RETAINED URETERAL STENT; RIGHT URETEROSCOPY LASER LITHOTRIPSY FOR RIGHT RENAL CALCULI; RIGHT URETERAL STENT PLACEMENT performed by Bernadette Patel MD at [address]         Current Outpatient Medications   Medication Sig Dispense Refill    phenazopyridine (PYRIDIUM) 100 MG tablet Take 1 tablet by mouth 3 times daily as needed for Pain (for burning and spasms) 30 tablet 1    diazePAM (VALIUM) 10 MG tablet Take 1 tablet by mouth every 8 hours as needed for Anxiety (always takes at least twice daily).       omeprazole (PRILOSEC) 20 MG delayed release capsule Take 1 capsule by mouth daily      sildenafil (VIAGRA) 100 MG tablet Take 1 tablet by mouth as needed for Erectile Dysfunction      cyclobenzaprine (FLEXERIL) 10 MG tablet Take 1 tablet by mouth 3 times daily as needed for Muscle spasms      dicyclomine (BENTYL) 10 MG capsule Take 1 capsule by mouth daily      rosuvastatin (CRESTOR) 5 MG tablet Take 1 tablet by mouth daily
follow-up in approximately 9 months for possible ESWL, KUB prior. Discussed if this does move its unlikely he will be able to pass the stone. - XR ABDOMEN (KUB) (SINGLE AP VIEW); Future  - POCT Urinalysis Dipstick w/ Micro (Auto)    2. Calculus of proximal right ureter  No stricture or hydronephrosis status post ureteroscopy on CT. Patient doing well. Orders Placed This Encounter   Procedures    XR ABDOMEN (KUB) (SINGLE AP VIEW)     Standing Status:   Future     Standing Expiration Date:   12/27/2024     Order Specific Question:   Reason for exam:     Answer:   left nephrolith    POCT Urinalysis Dipstick w/ Micro (Auto)        Return in about 9 months (around 9/27/2024) for KUB prior. All information inputted into the note by the MA to include chief complaint, past medical history, past surgical history, medications, allergies, social and family history and review of systems has been reviewed and updated as needed by me. EMR Dragon/transcription disclaimer: Much of this documentt is electronic  transcription/translation of spoken language to printed text. The  electronic translation of spoken language may be erroneous, or at times,  nonsensical words or phrases may be inadvertently transcribed.  Although I  have reviewed the document for such errors, some may still exist.

## 2024-04-16 ENCOUNTER — TELEPHONE (OUTPATIENT)
Dept: GASTROENTEROLOGY | Facility: CLINIC | Age: 54
End: 2024-04-16
Payer: COMMERCIAL

## 2024-04-23 NOTE — TELEPHONE ENCOUNTER
PATIENT IS DUE FOR A REPEAT ENDOSCOPY. LEFT VM TO HAVE PT CALL TO SCHEDULE AN OV TO DISCUSS THIS.           Patient remains in ED safe room 25 per protocols for 1:1 observation. No signs of distress. Mother at bedside. Patient remains calm and cooperative.     Safe meal tray ordered for lunch.

## 2024-05-22 ENCOUNTER — OFFICE VISIT (OUTPATIENT)
Dept: GASTROENTEROLOGY | Facility: CLINIC | Age: 54
End: 2024-05-22
Payer: COMMERCIAL

## 2024-05-22 VITALS
OXYGEN SATURATION: 98 % | DIASTOLIC BLOOD PRESSURE: 98 MMHG | BODY MASS INDEX: 28.49 KG/M2 | SYSTOLIC BLOOD PRESSURE: 130 MMHG | WEIGHT: 199 LBS | TEMPERATURE: 97.8 F | HEART RATE: 84 BPM | HEIGHT: 70 IN

## 2024-05-22 DIAGNOSIS — R13.14 PHARYNGOESOPHAGEAL DYSPHAGIA: ICD-10-CM

## 2024-05-22 DIAGNOSIS — K22.70 BARRETT'S ESOPHAGUS WITHOUT DYSPLASIA: Primary | ICD-10-CM

## 2024-05-22 PROCEDURE — 99214 OFFICE O/P EST MOD 30 MIN: CPT | Performed by: NURSE PRACTITIONER

## 2024-05-22 NOTE — H&P (VIEW-ONLY)
General acute hospital GASTROENTEROLOGY - OFFICE NOTE    5/22/2024    Jae Guevara   1970    Primary Physician: Elmer Montague MD    Chief Complaint   Patient presents with    Endoscopy   Singh's esophagus       HISTORY OF PRESENT ILLNESS:     Jae Guevara is a 53 y.o. male presents  with singh's esophagus diagnosed more than 10 years ago. Taking omeprazole daily with good control.         Dysphagia   Over 1 year. He points to the neck area where this occurs. Noted with solid foods and pills. Esophageal dilation in the past has helped.  No problems with liquids. No weight loss.       UPPER GI ENDOSCOPY (03/25/2021 12:36) recall 3 years.   Tissue Pathology Exam (03/25/2021 12:40) no dysplasia.       Past Medical History:   Diagnosis Date    Anxiety     Singh's esophagus     Dysthymia     GERD (gastroesophageal reflux disease)     Hiatal hernia     High arches Birth    IBS (irritable bowel syndrome)     Ingrown toenail June 2022    Nephrolithiasis     Schatzki's ring     Verruca        Past Surgical History:   Procedure Laterality Date    BACK SURGERY      x 2    CERVICAL FUSION      COLONOSCOPY N/A 11/09/2017    Procedure: COLONOSCOPY WITH ANESTHESIA;  Surgeon: David Portillo MD;  Location: [facility] ENDOSCOPY;  Service:     ENDOSCOPY  08/20/2014    ENDOSCOPY N/A 11/09/2017    Procedure: ESOPHAGOGASTRODUODENOSCOPY WITH ANESTHESIA;  Surgeon: David Portillo MD;  Location: [facility] ENDOSCOPY;  Service:     ENDOSCOPY N/A 02/12/2018    Procedure: ESOPHAGOGASTRODUODENOSCOPY WITH ANESTHESIA;  Surgeon: David Portillo MD;  Location: [facility] ENDOSCOPY;  Service:     ENDOSCOPY N/A 03/25/2021    Procedure: ESOPHAGOGASTRODUODENOSCOPY WITH ANESTHESIA;  Surgeon: David Portillo MD;  Location: [facility] ENDOSCOPY;  Service: Gastroenterology;  Laterality: N/A;  pre dysphagia; singh's; abdominal pain  post dilation; Elmer Raman MD    ENDOSCOPY AND COLONOSCOPY  11/05/2012    OTHER SURGICAL HISTORY       Lithotripsy x 2    TONSILLECTOMY AND ADENOIDECTOMY         Outpatient Medications Marked as Taking for the 5/22/24 encounter (Office Visit) with Janette Meek APRN   Medication Sig Dispense Refill    cyclobenzaprine (FLEXERIL) 10 MG tablet Take 1 tablet by mouth 3 (Three) Times a Day As Needed for Muscle Spasms.      dexmethylphenidate (FOCALIN) 10 MG tablet Take 1 tablet by mouth 2 (Two) Times a Day.      diazePAM (VALIUM) 10 MG tablet Take 1 tablet by mouth 2 (Two) Times a Day As Needed for Anxiety.      dicyclomine (BENTYL) 10 MG capsule Take 1 capsule by mouth Every 6 (Six) Hours As Needed (abdominal pain). 30 capsule 7    ibuprofen (ADVIL,MOTRIN) 800 MG tablet Take 1 tablet by mouth 2 (two) times a day.      omeprazole (priLOSEC) 20 MG capsule Take 1 capsule by mouth 2 (Two) Times a Day Before Meals. 60 capsule 2    pregabalin (LYRICA) 150 MG capsule Take 1 capsule by mouth 2 (Two) Times a Day.      rosuvastatin (CRESTOR) 5 MG tablet Take 1 tablet by mouth Daily.         Allergies   Allergen Reactions    Chantix [Varenicline] Mental Status Change       Social History     Socioeconomic History    Marital status:    Tobacco Use    Smoking status: Every Day     Current packs/day: 2.00     Average packs/day: 2.0 packs/day for 10.0 years (20.0 ttl pk-yrs)     Types: Cigarettes    Smokeless tobacco: Never   Vaping Use    Vaping status: Some Days    Substances: THC, CBD, Flavoring    Devices: Pre-filled or refillable cartridge   Substance and Sexual Activity    Alcohol use: No    Drug use: No    Sexual activity: Not Currently     Partners: Female     Birth control/protection: Abstinence       Family History   Problem Relation Age of Onset    Colon polyps Paternal Uncle         in his 50's    Colon cancer Paternal Grandfather         in his 70's.     Colon polyps Paternal Aunt         in her 50's     Colon polyps Paternal Uncle         in his 50's.     Colon polyps Paternal Aunt         in her 50's      "Cancer Sister          2000       Review of Systems   Constitutional:  Negative for chills, fever and unexpected weight change.   Respiratory:  Negative for shortness of breath.    Cardiovascular:  Negative for chest pain.   Gastrointestinal:  Negative for abdominal distention, abdominal pain, nausea and vomiting.        Vitals:    24 1300   BP: 130/98   Pulse: 84   Temp: 97.8 °F (36.6 °C)   SpO2: 98%   Weight: 90.3 kg (199 lb)   Height: 177.8 cm (70\")      Body mass index is 28.55 kg/m².    Physical Exam  Vitals reviewed.   Constitutional:       General: He is not in acute distress.  Cardiovascular:      Rate and Rhythm: Normal rate and regular rhythm.      Heart sounds: Normal heart sounds.   Pulmonary:      Effort: Pulmonary effort is normal.      Breath sounds: Normal breath sounds.   Abdominal:      General: Bowel sounds are normal. There is no distension.      Palpations: Abdomen is soft.      Tenderness: There is no abdominal tenderness.   Skin:     General: Skin is warm and dry.   Neurological:      Mental Status: He is alert.         Results for orders placed or performed during the hospital encounter of 21   Tissue Pathology Exam    Specimen: Esophagus; Tissue   Result Value Ref Range    Case Report       Surgical Pathology Report                         Case: [id number]                                  Authorizing Provider:  David Portillo MD      Collected:           2021 12:40 PM          Ordering Location:     [facility]     Received:            2021 02:24 PM                                 ENDOSCOPY                                                                    Pathologist:           Mathew Chan MD                                                        Specimen:    Esophagus, bx ge junction                                                                  Final Diagnosis       Gastroesophageal junction, biopsies:  Mild chronic " "esophagogastritis.  No evidence of intestinal metaplasia or dysplasia.      Gross Description       Specimen #1 is received in a formalin filled container labeled with the patient's name, date of birth, and \"biopsy GE junction\".  The specimen consists of 5 yellow-tan soft tissue fragments aggregating to 0.4 x 0.3 x 0.1 cm, totally submitted in block 1A.      Microscopic Description       Histologic sections show small fragments of gastroesophageal junction mucosa with mild chronic inflammation and hemorrhage but no evidence of goblet cell metaplasia or dysplasia.             ASSESSMENT AND PLAN    Assessment & Plan     Diagnoses and all orders for this visit:    1. Cabrera's esophagus without dysplasia (Primary)  -     Case Request; Standing  -     Case Request    2. Pharyngoesophageal dysphagia  -     Case Request; Standing  -     Case Request    Other orders  -     Implement Anesthesia Orders Day of Procedure; Standing  -     Obtain Informed Consent; Standing          Schedule egd.  Explained the importance of Cabrera's disease and that there is a small risk of those individuals developing cancer of the esophagus later in life.  The risk is typically small, with the majority only having a 1/2-1% lifetime risk.  Therefore, I recommend that you continue treating your acid reflux with lifestyle modifications. This includes gradually losing weight to achieve ideal body wt., elevation of the head of bed by 4-6 inches, nothing to eat or drink 3 hours prior to lying down, avoiding tight clothing, stress reduction, tobacco cessation, reduction of alcohol intake, and dietary restrictions (avoiding caffeine, coffee, fatty foods, mints, chocolate, spicy foods and tomato based sauces as much as possible).      I advised calcium with vit. D supplementation daily and why.  I discussed possible assoc. of renal disease and dementia with PPI use, although so far there has been no definitive evidence of causation.  An ideal goal, " would be to stop PPI and other acid reducing medication use as soon as medically reasonable and use non-medical treatments such as healthy life style modifications to control symptoms and disease.  Although that goal is not obtainable for all, life style changes should always be tried to see if that goal can be obtained.       ESOPHAGOGASTRODUODENOSCOPY WITH ANESTHESIA (N/A)  Risk, benefits, and alternatives of endoscopy were explained in full.  They understand that there is a risk of bleeding, perforation, and infection.  The risk of perforation goes up with esophageal dilation.  Other options to evaluate UGI complaints could involve barium swallow or UGI series, but these would be diagnostic tests only.  Patient was given time to ask questions.  I answered them to their satisfaction and they are agreeable to proceeding         No follow-ups on file.            There are no Patient Instructions on file for this visit.      Janette Meek, APRN

## 2024-05-22 NOTE — PROGRESS NOTES
[facility] GASTROENTEROLOGY - OFFICE NOTE    5/22/2024    Jae Guevara   1970    Primary Physician: Elmer Montague MD    Chief Complaint   Patient presents with    Endoscopy   Singh's esophagus       HISTORY OF PRESENT ILLNESS:     Jae Guevara is a 53 y.o. male presents  with singh's esophagus diagnosed more than 10 years ago. Taking omeprazole daily with good control.         Dysphagia   Over 1 year. He points to the neck area where this occurs. Noted with solid foods and pills. Esophageal dilation in the past has helped.  No problems with liquids. No weight loss.       UPPER GI ENDOSCOPY (03/25/2021 12:36) recall 3 years.   Tissue Pathology Exam (03/25/2021 12:40) no dysplasia.       Past Medical History:   Diagnosis Date    Anxiety     Singh's esophagus     Dysthymia     GERD (gastroesophageal reflux disease)     Hiatal hernia     High arches Birth    IBS (irritable bowel syndrome)     Ingrown toenail June 2022    Nephrolithiasis     Schatzki's ring     Verruca        Past Surgical History:   Procedure Laterality Date    BACK SURGERY      x 2    CERVICAL FUSION      COLONOSCOPY N/A 11/09/2017    Procedure: COLONOSCOPY WITH ANESTHESIA;  Surgeon: David Portillo MD;  Location: [facility] ENDOSCOPY;  Service:     ENDOSCOPY  08/20/2014    ENDOSCOPY N/A 11/09/2017    Procedure: ESOPHAGOGASTRODUODENOSCOPY WITH ANESTHESIA;  Surgeon: David Portillo MD;  Location: [facility] ENDOSCOPY;  Service:     ENDOSCOPY N/A 02/12/2018    Procedure: ESOPHAGOGASTRODUODENOSCOPY WITH ANESTHESIA;  Surgeon: David Portillo MD;  Location: [facility] ENDOSCOPY;  Service:     ENDOSCOPY N/A 03/25/2021    Procedure: ESOPHAGOGASTRODUODENOSCOPY WITH ANESTHESIA;  Surgeon: David Portillo MD;  Location: [facility] ENDOSCOPY;  Service: Gastroenterology;  Laterality: N/A;  pre dysphagia; singh's; abdominal pain  post dilation; Elmer Raman MD    ENDOSCOPY AND COLONOSCOPY  11/05/2012    OTHER SURGICAL HISTORY       Lithotripsy x 2    TONSILLECTOMY AND ADENOIDECTOMY         Outpatient Medications Marked as Taking for the 5/22/24 encounter (Office Visit) with Janette Meek APRN   Medication Sig Dispense Refill    cyclobenzaprine (FLEXERIL) 10 MG tablet Take 1 tablet by mouth 3 (Three) Times a Day As Needed for Muscle Spasms.      dexmethylphenidate (FOCALIN) 10 MG tablet Take 1 tablet by mouth 2 (Two) Times a Day.      diazePAM (VALIUM) 10 MG tablet Take 1 tablet by mouth 2 (Two) Times a Day As Needed for Anxiety.      dicyclomine (BENTYL) 10 MG capsule Take 1 capsule by mouth Every 6 (Six) Hours As Needed (abdominal pain). 30 capsule 7    ibuprofen (ADVIL,MOTRIN) 800 MG tablet Take 1 tablet by mouth 2 (two) times a day.      omeprazole (priLOSEC) 20 MG capsule Take 1 capsule by mouth 2 (Two) Times a Day Before Meals. 60 capsule 2    pregabalin (LYRICA) 150 MG capsule Take 1 capsule by mouth 2 (Two) Times a Day.      rosuvastatin (CRESTOR) 5 MG tablet Take 1 tablet by mouth Daily.         Allergies   Allergen Reactions    Chantix [Varenicline] Mental Status Change       Social History     Socioeconomic History    Marital status:    Tobacco Use    Smoking status: Every Day     Current packs/day: 2.00     Average packs/day: 2.0 packs/day for 10.0 years (20.0 ttl pk-yrs)     Types: Cigarettes    Smokeless tobacco: Never   Vaping Use    Vaping status: Some Days    Substances: THC, CBD, Flavoring    Devices: Pre-filled or refillable cartridge   Substance and Sexual Activity    Alcohol use: No    Drug use: No    Sexual activity: Not Currently     Partners: Female     Birth control/protection: Abstinence       Family History   Problem Relation Age of Onset    Colon polyps Paternal Uncle         in his 50's    Colon cancer Paternal Grandfather         in his 70's.     Colon polyps Paternal Aunt         in her 50's     Colon polyps Paternal Uncle         in his 50's.     Colon polyps Paternal Aunt         in her 50's      "Cancer Sister          2000       Review of Systems   Constitutional:  Negative for chills, fever and unexpected weight change.   Respiratory:  Negative for shortness of breath.    Cardiovascular:  Negative for chest pain.   Gastrointestinal:  Negative for abdominal distention, abdominal pain, nausea and vomiting.        Vitals:    24 1300   BP: 130/98   Pulse: 84   Temp: 97.8 °F (36.6 °C)   SpO2: 98%   Weight: 90.3 kg (199 lb)   Height: 177.8 cm (70\")      Body mass index is 28.55 kg/m².    Physical Exam  Vitals reviewed.   Constitutional:       General: He is not in acute distress.  Cardiovascular:      Rate and Rhythm: Normal rate and regular rhythm.      Heart sounds: Normal heart sounds.   Pulmonary:      Effort: Pulmonary effort is normal.      Breath sounds: Normal breath sounds.   Abdominal:      General: Bowel sounds are normal. There is no distension.      Palpations: Abdomen is soft.      Tenderness: There is no abdominal tenderness.   Skin:     General: Skin is warm and dry.   Neurological:      Mental Status: He is alert.         Results for orders placed or performed during the hospital encounter of 21   Tissue Pathology Exam    Specimen: Esophagus; Tissue   Result Value Ref Range    Case Report       Surgical Pathology Report                         Case: [id number]                                  Authorizing Provider:  David Portillo MD      Collected:           2021 12:40 PM          Ordering Location:     [facility]     Received:            2021 02:24 PM                                 ENDOSCOPY                                                                    Pathologist:           Mathew Chan MD                                                        Specimen:    Esophagus, bx ge junction                                                                  Final Diagnosis       Gastroesophageal junction, biopsies:  Mild chronic " "esophagogastritis.  No evidence of intestinal metaplasia or dysplasia.      Gross Description       Specimen #1 is received in a formalin filled container labeled with the patient's name, date of birth, and \"biopsy GE junction\".  The specimen consists of 5 yellow-tan soft tissue fragments aggregating to 0.4 x 0.3 x 0.1 cm, totally submitted in block 1A.      Microscopic Description       Histologic sections show small fragments of gastroesophageal junction mucosa with mild chronic inflammation and hemorrhage but no evidence of goblet cell metaplasia or dysplasia.             ASSESSMENT AND PLAN    Assessment & Plan     Diagnoses and all orders for this visit:    1. Cabrera's esophagus without dysplasia (Primary)  -     Case Request; Standing  -     Case Request    2. Pharyngoesophageal dysphagia  -     Case Request; Standing  -     Case Request    Other orders  -     Implement Anesthesia Orders Day of Procedure; Standing  -     Obtain Informed Consent; Standing          Schedule egd.  Explained the importance of Cabrera's disease and that there is a small risk of those individuals developing cancer of the esophagus later in life.  The risk is typically small, with the majority only having a 1/2-1% lifetime risk.  Therefore, I recommend that you continue treating your acid reflux with lifestyle modifications. This includes gradually losing weight to achieve ideal body wt., elevation of the head of bed by 4-6 inches, nothing to eat or drink 3 hours prior to lying down, avoiding tight clothing, stress reduction, tobacco cessation, reduction of alcohol intake, and dietary restrictions (avoiding caffeine, coffee, fatty foods, mints, chocolate, spicy foods and tomato based sauces as much as possible).      I advised calcium with vit. D supplementation daily and why.  I discussed possible assoc. of renal disease and dementia with PPI use, although so far there has been no definitive evidence of causation.  An ideal goal, " would be to stop PPI and other acid reducing medication use as soon as medically reasonable and use non-medical treatments such as healthy life style modifications to control symptoms and disease.  Although that goal is not obtainable for all, life style changes should always be tried to see if that goal can be obtained.       ESOPHAGOGASTRODUODENOSCOPY WITH ANESTHESIA (N/A)  Risk, benefits, and alternatives of endoscopy were explained in full.  They understand that there is a risk of bleeding, perforation, and infection.  The risk of perforation goes up with esophageal dilation.  Other options to evaluate UGI complaints could involve barium swallow or UGI series, but these would be diagnostic tests only.  Patient was given time to ask questions.  I answered them to their satisfaction and they are agreeable to proceeding         No follow-ups on file.            There are no Patient Instructions on file for this visit.      Janette Meek, APRN

## 2024-06-11 ENCOUNTER — ANESTHESIA (OUTPATIENT)
Dept: GASTROENTEROLOGY | Facility: HOSPITAL | Age: 54
End: 2024-06-11
Payer: COMMERCIAL

## 2024-06-11 ENCOUNTER — HOSPITAL ENCOUNTER (OUTPATIENT)
Facility: HOSPITAL | Age: 54
Setting detail: HOSPITAL OUTPATIENT SURGERY
Discharge: HOME | End: 2024-06-11
Attending: INTERNAL MEDICINE | Admitting: INTERNAL MEDICINE
Payer: COMMERCIAL

## 2024-06-11 ENCOUNTER — ANESTHESIA EVENT (OUTPATIENT)
Dept: GASTROENTEROLOGY | Facility: HOSPITAL | Age: 54
End: 2024-06-11
Payer: COMMERCIAL

## 2024-06-11 VITALS
DIASTOLIC BLOOD PRESSURE: 91 MMHG | SYSTOLIC BLOOD PRESSURE: 126 MMHG | TEMPERATURE: 98.6 F | RESPIRATION RATE: 22 BRPM | OXYGEN SATURATION: 93 % | HEIGHT: 70 IN | WEIGHT: 195 LBS | HEART RATE: 88 BPM | BODY MASS INDEX: 27.92 KG/M2

## 2024-06-11 DIAGNOSIS — K22.70 BARRETT'S ESOPHAGUS WITHOUT DYSPLASIA: ICD-10-CM

## 2024-06-11 DIAGNOSIS — R13.14 PHARYNGOESOPHAGEAL DYSPHAGIA: ICD-10-CM

## 2024-06-11 PROCEDURE — 87081 CULTURE SCREEN ONLY: CPT | Performed by: INTERNAL MEDICINE

## 2024-06-11 PROCEDURE — 88305 TISSUE EXAM BY PATHOLOGIST: CPT | Performed by: INTERNAL MEDICINE

## 2024-06-11 PROCEDURE — 25810000003 SODIUM CHLORIDE 0.9 % SOLUTION: Performed by: NURSE ANESTHETIST, CERTIFIED REGISTERED

## 2024-06-11 PROCEDURE — 25010000002 PROPOFOL 10 MG/ML EMULSION: Performed by: NURSE ANESTHETIST, CERTIFIED REGISTERED

## 2024-06-11 RX ORDER — SODIUM CHLORIDE 9 MG/ML
100 INJECTION, SOLUTION INTRAVENOUS CONTINUOUS
Status: DISCONTINUED | OUTPATIENT
Start: 2024-06-11 | End: 2024-06-11 | Stop reason: HOSPADM

## 2024-06-11 RX ORDER — ONDANSETRON 2 MG/ML
4 INJECTION INTRAMUSCULAR; INTRAVENOUS ONCE AS NEEDED
Status: DISCONTINUED | OUTPATIENT
Start: 2024-06-11 | End: 2024-06-11 | Stop reason: HOSPADM

## 2024-06-11 RX ORDER — PROPOFOL 10 MG/ML
VIAL (ML) INTRAVENOUS AS NEEDED
Status: DISCONTINUED | OUTPATIENT
Start: 2024-06-11 | End: 2024-06-11 | Stop reason: SURG

## 2024-06-11 RX ORDER — SUCRALFATE 1 G/1
1 TABLET ORAL 4 TIMES DAILY
Qty: 120 TABLET | Refills: 0 | Status: SHIPPED | OUTPATIENT
Start: 2024-06-11 | End: 2024-07-11

## 2024-06-11 RX ORDER — LIDOCAINE HYDROCHLORIDE 20 MG/ML
INJECTION, SOLUTION EPIDURAL; INFILTRATION; INTRACAUDAL; PERINEURAL AS NEEDED
Status: DISCONTINUED | OUTPATIENT
Start: 2024-06-11 | End: 2024-06-11 | Stop reason: SURG

## 2024-06-11 RX ORDER — SODIUM CHLORIDE 9 MG/ML
40 INJECTION, SOLUTION INTRAVENOUS AS NEEDED
Status: CANCELLED | OUTPATIENT
Start: 2024-06-11

## 2024-06-11 RX ORDER — SODIUM CHLORIDE 0.9 % (FLUSH) 0.9 %
10 SYRINGE (ML) INJECTION EVERY 12 HOURS SCHEDULED
Status: DISCONTINUED | OUTPATIENT
Start: 2024-06-11 | End: 2024-06-11 | Stop reason: HOSPADM

## 2024-06-11 RX ORDER — SODIUM CHLORIDE 0.9 % (FLUSH) 0.9 %
10 SYRINGE (ML) INJECTION AS NEEDED
Status: DISCONTINUED | OUTPATIENT
Start: 2024-06-11 | End: 2024-06-11 | Stop reason: HOSPADM

## 2024-06-11 RX ADMIN — LIDOCAINE HYDROCHLORIDE 50 MG: 20 INJECTION, SOLUTION EPIDURAL; INFILTRATION; INTRACAUDAL; PERINEURAL at 12:40

## 2024-06-11 RX ADMIN — PROPOFOL 50 MG: 10 INJECTION, EMULSION INTRAVENOUS at 12:46

## 2024-06-11 RX ADMIN — PROPOFOL 100 MG: 10 INJECTION, EMULSION INTRAVENOUS at 12:40

## 2024-06-11 RX ADMIN — PROPOFOL 50 MG: 10 INJECTION, EMULSION INTRAVENOUS at 12:43

## 2024-06-11 RX ADMIN — SODIUM CHLORIDE 100 ML/HR: 9 INJECTION, SOLUTION INTRAVENOUS at 11:26

## 2024-06-11 NOTE — ANESTHESIA POSTPROCEDURE EVALUATION
Patient: Jae Guevara    Procedure Summary       Date: 06/11/24 Room / Location: [facility] ENDOSCOPY 4 / [facility] ENDOSCOPY    Anesthesia Start: 1237 Anesthesia Stop: 1253    Procedure: ESOPHAGOGASTRODUODENOSCOPY WITH ANESTHESIA Diagnosis:       Cabrera's esophagus without dysplasia      Pharyngoesophageal dysphagia      (Cabrera's esophagus without dysplasia [K22.70])      (Pharyngoesophageal dysphagia [R13.14])    Surgeons: David Portillo MD Provider: Rick Wright CRNA    Anesthesia Type: MAC ASA Status: 2            Anesthesia Type: MAC    Vitals  No vitals data found for the desired time range.          Post Anesthesia Care and Evaluation    Patient location during evaluation: PHASE II  Patient participation: complete - patient participated  Level of consciousness: awake  Pain score: 0  Pain management: adequate    Airway patency: patent  Anesthetic complications: No anesthetic complications  PONV Status: none  Cardiovascular status: acceptable  Respiratory status: acceptable  Hydration status: acceptable

## 2024-06-11 NOTE — NURSING NOTE
Patient stated had a drink of lemonade on the way over to the hospital at 1030. Called dr rubin to make her aware, procedure cannot be done until 1230.

## 2024-06-11 NOTE — ANESTHESIA PREPROCEDURE EVALUATION
Anesthesia Evaluation     Patient summary reviewed and Nursing notes reviewed   NPO Solid Status: > 8 hours  NPO Liquid Status: > 8 hours           Airway   Mallampati: I  TM distance: >3 FB  Neck ROM: full  No difficulty expected  Dental - normal exam     Pulmonary - normal exam   (+) a smoker Current, Smoked day of surgery, cigarettes,  Cardiovascular - negative cardio ROS and normal exam        Neuro/Psych  (+) psychiatric history Anxiety and Depression  GI/Hepatic/Renal/Endo    (+) hiatal hernia, GERD, GI bleeding upper resolved, renal disease- stones    Musculoskeletal (-) negative ROS    Abdominal  - normal exam   Substance History - negative use     OB/GYN          Other - negative ROS                   Anesthesia Plan    ASA 2     MAC   total IV anesthesia  intravenous induction     Anesthetic plan, risks, benefits, and alternatives have been provided, discussed and informed consent has been obtained with: patient.    CODE STATUS:

## 2024-06-12 LAB — UREASE TISS QL: NEGATIVE

## 2024-07-08 RX ORDER — SUCRALFATE 1 G/1
1 TABLET ORAL 4 TIMES DAILY
Qty: 120 TABLET | Refills: 0 | Status: SHIPPED | OUTPATIENT
Start: 2024-07-08

## 2024-08-05 RX ORDER — SUCRALFATE 1 G/1
1 TABLET ORAL 4 TIMES DAILY
Qty: 120 TABLET | Refills: 0 | OUTPATIENT
Start: 2024-08-05

## (undated) DEVICE — CONMED SCOPE SAVER BITE BLOCK, 20X27 MM: Brand: SCOPE SAVER

## (undated) DEVICE — YANKAUER,BULB TIP WITH VENT: Brand: ARGYLE

## (undated) DEVICE — GUIDEWIRE ENDOSCP L150CM DIA0.035IN TIP 3CM PTFE NIT

## (undated) DEVICE — THE CHANNEL CLEANING BRUSH IS A NYLON FLEXI BRUSH ATTACHED TO A FLEXIBLE PLASTIC SHEATH DESIGNED TO SAFELY REMOVE DEBRIS FROM FLEXIBLE ENDOSCOPES.

## (undated) DEVICE — TUBE ET 7MM NSL ORAL BASIC CUF INTMED MURPHY EYE RADPQ MRK

## (undated) DEVICE — CATHETER URET 5FR L70CM OPN END SGL LUMN INJ HUB FLEXIMA

## (undated) DEVICE — SENSR O2 OXIMAX FNGR A/ 18IN NONSTR

## (undated) DEVICE — Device: Brand: DEFENDO AIR/WATER/SUCTION AND BIOPSY VALVE

## (undated) DEVICE — GLOVE SURG SZ 7 L12IN FNGR THK79MIL GRN LTX FREE

## (undated) DEVICE — STERILE LATEX POWDER FREE SURGICAL GLOVES WITH HYDROGEL COATING: Brand: PROTEXIS

## (undated) DEVICE — SOLUTION IRRIG 3000ML 0.9% SOD CHL USP UROMATIC PLAS CONT

## (undated) DEVICE — SURGICAL PROCEDURE PACK CYTOSCOPY

## (undated) DEVICE — BAG DRNGE COMB PK

## (undated) DEVICE — SEAL ENDO INSTR SELF SEAL UROLOGY

## (undated) DEVICE — NITINOL STONE RETRIEVAL DEVICE: Brand: DAKOTA

## (undated) DEVICE — GLOVE SURG SZ 75 CRM LTX FREE POLYISOPRENE POLYMER BEAD ANTI

## (undated) DEVICE — PAD,EYE,1-5/8X2 5/8,STERILE,LF,1/PK: Brand: MEDLINE

## (undated) DEVICE — MSK O2 MD CONCENTR A/ LF 7FT 1P/U

## (undated) DEVICE — TBG SMPL FLTR LINE NASL 02/C02 A/ BX/100

## (undated) DEVICE — ENDOGATOR AUXILIARY WATER JET CONNECTOR: Brand: ENDOGATOR

## (undated) DEVICE — CUFF,BP,DISP,1 TUBE,ADULT,HP: Brand: MEDLINE

## (undated) DEVICE — FRCP BX RADJAW4 NDL 2.8 240 STD OG

## (undated) DEVICE — FRCP BIOP ENDO CAPTURAPRO SPK SERR 2.8MM 230CM

## (undated) DEVICE — FIBER LSR 200UM 2J 80HZ 60W D F L FOR LITHO MOSES